# Patient Record
Sex: FEMALE | Race: WHITE | NOT HISPANIC OR LATINO | Employment: UNEMPLOYED | URBAN - METROPOLITAN AREA
[De-identification: names, ages, dates, MRNs, and addresses within clinical notes are randomized per-mention and may not be internally consistent; named-entity substitution may affect disease eponyms.]

---

## 2017-04-02 ENCOUNTER — APPOINTMENT (OUTPATIENT)
Dept: ULTRASOUND IMAGING | Facility: CLINIC | Age: 47
End: 2017-04-02
Attending: EMERGENCY MEDICINE
Payer: COMMERCIAL

## 2017-04-02 ENCOUNTER — OFFICE VISIT (OUTPATIENT)
Dept: URGENT CARE | Facility: URGENT CARE | Age: 47
End: 2017-04-02
Payer: COMMERCIAL

## 2017-04-02 ENCOUNTER — HOSPITAL ENCOUNTER (EMERGENCY)
Facility: CLINIC | Age: 47
Discharge: HOME | End: 2017-04-02
Attending: EMERGENCY MEDICINE | Admitting: EMERGENCY MEDICINE
Payer: COMMERCIAL

## 2017-04-02 ENCOUNTER — APPOINTMENT (OUTPATIENT)
Dept: GENERAL RADIOLOGY | Facility: CLINIC | Age: 47
End: 2017-04-02
Attending: EMERGENCY MEDICINE
Payer: COMMERCIAL

## 2017-04-02 VITALS
WEIGHT: 188 LBS | OXYGEN SATURATION: 99 % | TEMPERATURE: 97.5 F | RESPIRATION RATE: 18 BRPM | HEIGHT: 72 IN | BODY MASS INDEX: 25.47 KG/M2 | SYSTOLIC BLOOD PRESSURE: 103 MMHG | DIASTOLIC BLOOD PRESSURE: 63 MMHG | HEART RATE: 79 BPM

## 2017-04-02 VITALS
BODY MASS INDEX: 25.55 KG/M2 | SYSTOLIC BLOOD PRESSURE: 100 MMHG | TEMPERATURE: 97.7 F | OXYGEN SATURATION: 96 % | WEIGHT: 188.6 LBS | DIASTOLIC BLOOD PRESSURE: 60 MMHG | HEIGHT: 72 IN | HEART RATE: 76 BPM

## 2017-04-02 DIAGNOSIS — L97.519 DIABETIC ULCER OF RIGHT FOOT ASSOCIATED WITH TYPE 2 DIABETES MELLITUS (H): ICD-10-CM

## 2017-04-02 DIAGNOSIS — R73.9 HYPERGLYCEMIA: ICD-10-CM

## 2017-04-02 DIAGNOSIS — L03.90 CELLULITIS, UNSPECIFIED CELLULITIS SITE: ICD-10-CM

## 2017-04-02 DIAGNOSIS — L97.501: ICD-10-CM

## 2017-04-02 DIAGNOSIS — E11.621 DIABETIC ULCER OF RIGHT FOOT ASSOCIATED WITH TYPE 2 DIABETES MELLITUS (H): ICD-10-CM

## 2017-04-02 DIAGNOSIS — L03.115 CELLULITIS OF RIGHT LOWER EXTREMITY: Primary | ICD-10-CM

## 2017-04-02 PROBLEM — R06.83 SNORING: Status: ACTIVE | Noted: 2017-04-02

## 2017-04-02 LAB — GLUCOSE BLDC GLUCOMTR-MCNC: 215 MG/DL (ref 70–99)

## 2017-04-02 PROCEDURE — 73630 X-RAY EXAM OF FOOT: CPT | Mod: RT

## 2017-04-02 PROCEDURE — 00000146 ZZHCL STATISTIC GLUCOSE BY METER IP

## 2017-04-02 PROCEDURE — 93971 EXTREMITY STUDY: CPT | Mod: RT

## 2017-04-02 PROCEDURE — 99212 OFFICE O/P EST SF 10 MIN: CPT | Performed by: FAMILY MEDICINE

## 2017-04-02 PROCEDURE — 99285 EMERGENCY DEPT VISIT HI MDM: CPT | Mod: 25

## 2017-04-02 PROCEDURE — 25000132 ZZH RX MED GY IP 250 OP 250 PS 637: Performed by: EMERGENCY MEDICINE

## 2017-04-02 RX ORDER — SULFAMETHOXAZOLE/TRIMETHOPRIM 800-160 MG
1 TABLET ORAL ONCE
Status: COMPLETED | OUTPATIENT
Start: 2017-04-02 | End: 2017-04-02

## 2017-04-02 RX ORDER — CEPHALEXIN 500 MG/1
500 CAPSULE ORAL 4 TIMES DAILY
Qty: 40 CAPSULE | Refills: 0 | Status: SHIPPED | OUTPATIENT
Start: 2017-04-02 | End: 2017-04-12

## 2017-04-02 RX ORDER — GINSENG 100 MG
CAPSULE ORAL
Status: DISCONTINUED
Start: 2017-04-02 | End: 2017-04-02 | Stop reason: HOSPADM

## 2017-04-02 RX ORDER — CEPHALEXIN 500 MG/1
500 CAPSULE ORAL ONCE
Status: COMPLETED | OUTPATIENT
Start: 2017-04-02 | End: 2017-04-02

## 2017-04-02 RX ORDER — SULFAMETHOXAZOLE/TRIMETHOPRIM 800-160 MG
1 TABLET ORAL 2 TIMES DAILY
Qty: 20 TABLET | Refills: 0 | Status: SHIPPED | OUTPATIENT
Start: 2017-04-02 | End: 2017-04-12

## 2017-04-02 RX ORDER — OXYBUTYNIN CHLORIDE 5 MG/1
TABLET, EXTENDED RELEASE ORAL DAILY
COMMUNITY
End: 2021-10-06

## 2017-04-02 RX ADMIN — SULFAMETHOXAZOLE AND TRIMETHOPRIM 1 TABLET: 800; 160 TABLET ORAL at 11:42

## 2017-04-02 RX ADMIN — CEPHALEXIN 500 MG: 500 CAPSULE ORAL at 11:42

## 2017-04-02 ASSESSMENT — ENCOUNTER SYMPTOMS: FEVER: 0

## 2017-04-02 NOTE — ED NOTES
ABCs intact. Pt hx diabetes. Pt started wearing new diabetic shoes last Friday-Sunday. Pt states she noticed some swelling on Wednesday. Pt was seen at [facility] in [place] today for a R foot infection and sent here for further evaluation.

## 2017-04-02 NOTE — NURSING NOTE
Chief Complaint   Patient presents with     Urgent Care     Pt is a diabetic with neuropathy in her feet. A couple days ago,pt noticed a blister on her right foot 5th toe that could be infected.       Initial /60  Pulse 76  Temp 97.7  F (36.5  C)  Ht 6' (1.829 m)  Wt 188 lb 9.6 oz (85.5 kg)  SpO2 96%  BMI 25.58 kg/m2 Estimated body mass index is 25.58 kg/(m^2) as calculated from the following:    Height as of this encounter: 6' (1.829 m).    Weight as of this encounter: 188 lb 9.6 oz (85.5 kg).  Medication Reconciliation: complete

## 2017-04-02 NOTE — ED AVS SNAPSHOT
[facility] Emergency Department    [address]    [facility] [address]    Phone:  [phone]    Fax:  [phone]                                       Aida Chong   MRN: [id number]    Department:  [facility] Emergency Department   Date of Visit:  4/2/2017           After Visit Summary Signature Page     I have received my discharge instructions, and my questions have been answered. I have discussed any challenges I see with this plan with the nurse or doctor.    ..........................................................................................................................................  Patient/Patient Representative Signature      ..........................................................................................................................................  Patient Representative Print Name and Relationship to Patient    ..................................................               ................................................  Date                                            Time    ..........................................................................................................................................  Reviewed by Signature/Title    ...................................................              ..............................................  Date                                                            Time

## 2017-04-02 NOTE — PROGRESS NOTES
Triage    Patient is a 43 y/o female with diabetes, h/o diabetic neuropathy, present to clinic with 2 ulcerative foot lesions, swelling of right foot with pain extending up to mid lower leg.    Patient with new diabetic shoes started wearing them on 3/27/17, noticed early ulcers on 3/29/17 and just wanted to wait to see if it would get better.  Foot with increase pain/swelling/drainage from ulcers.  Patient did not have transportation and awaited the arrival of her father from [place] to take her to clinic today    Today c/o increased foot pain and swelling, 2 ulcers, now draining, increased in size since 3/29/17.  No fever, no n/v/d, no uri symptoms    /60  Pulse 76  Temp 97.7  F (36.5  C)  Ht 6' (1.829 m)  Wt 188 lb 9.6 oz (85.5 kg)  SpO2 96%  BMI 25.58 kg/m2    R foot with thickened dry/scaly skin, dirt embedded in toes and foot, ulcerative lesion that involves the majority of the 5th toe and ulcerative lesion on posterior heel, just below achilles tendon, swelling/tenderness from toes to mid lower calf, no posterior calf tenderness, tender more on anterior lower leg, faint DP pulse    Soaked foot, dressing placed-to ER for further intervention and evaluation, ? Needs IV abx    Patient will go to ER with father via personal vehicle, [facility]    Syl Ann MD

## 2017-04-02 NOTE — DISCHARGE INSTRUCTIONS
Discharge Instructions  Cellulitis    Cellulitis is an infection of the skin that occurs when bacteria enter the skin.   Symptoms are generally redness, swelling, warmth and pain.  Your infection appeared to be appropriate to treat at home with antibiotics.  However, sometimes your infection may be worse than it seemed at first, or may worsen with time. If you have new or worse symptoms, you may need to be seen again in the Emergency Department or by your primary doctor.    Return to the Emergency Department if:    The redness, pain, or swelling gets a lot worse.  If the red area was marked, return if it is red beyond the marked area.    You are unable to get your antibiotics, or are vomiting them up or you can t take them.    You are feeling more ill, weak or lightheaded.    You start to run a new fever (temperature >101).    Anything else about the infection worries or concerns you.  Treatment:    Start your antibiotics right away, and take them as prescribed. Be sure to finish the whole prescription, even if you are better.    Apply a heating pad, warm packs, or warm water soaks to the infected area for 15 minutes at a time, at least 3 times a day. Do not use a heating pad on your feet or legs if you have diabetes. Do not sleep with a heating pad on, since this can cause burns or skin injury.    Rest your injured area for at least 1-2 days. After that you may start using your extremity again as long as there is not too much pain.     Raise the injured area above the level of your heart as much as possible in the first 1-2 days.    Tylenol  (acetaminophen), Motrin  (ibuprofen), or Advil  (ibuprofen) may help may help reduce pain and fever and may help you feel more comfortable. Be sure to read and follow the package directions, and ask your doctor if you have questions.    Follow-up with your doctor:    Re-check in clinic within 2-3 days.  Probiotics: If you have been given an antibiotic, you may want to also  "take a probiotic pill or eat yogurt with live cultures. Probiotics have \"good bacteria\" to help your intestines stay healthy. Studies have shown that probiotics help prevent diarrhea and other intestine problems (including C. diff infection) when you take antibiotics. You can buy these without a prescription in the pharmacy section of the store.     If you were given a prescription for medicine here today, be sure to read all of the information (including the package insert) that comes with your prescription.  This will include important information about the medicine, its side effects, and any warnings that you need to know about.  The pharmacist who fills the prescription can provide more information and answer questions you may have about the medicine.  If you have questions or concerns that the pharmacist cannot address, please call or return to the Emergency Department.     Opioid Medication Information    Pain medications are among the most commonly prescribed medicines, so we are including this information for all our patients. If you did not receive pain medication or get a prescription for pain medicine, you can ignore it.     You may have been given a prescription for an opioid (narcotic) pain medicine and/or have received a pain medicine while here in the Emergency Department. These medicines can make you drowsy or impaired. You must not drive, operate dangerous equipment, or engage in any other dangerous activities while taking these medications. If you drive while taking these medications, you could be arrested for DUI, or driving under the influence. Do not drink any alcohol while you are taking these medications.     Opioid pain medications can cause addiction. If you have a history of chemical dependency of any type, you are at a higher risk of becoming addicted to pain medications.  Only take these prescribed medications to treat your pain when all other options have been tried. Take it for as short " a time and as few doses as possible. Store your pain pills in a secure place, as they are frequently stolen and provide a dangerous opportunity for children or visitors in your house to start abusing these powerful medications. We will not replace any lost or stolen medicine.  As soon as your pain is better, you should flush all your remaining medication.     Many prescription pain medications contain Tylenol  (acetaminophen), including Vicodin , Tylenol #3 , Norco , Lortab , and Percocet .  You should not take any extra pills of Tylenol  if you are using these prescription medications or you can get very sick.  Do not ever take more than 3000 mg of acetaminophen in any 24 hour period.    All opioids tend to cause constipation. Drink plenty of water and eat foods that have a lot of fiber, such as fruits, vegetables, prune juice, apple juice and high fiber cereal.  Take a laxative if you don t move your bowels at least every other day. Miralax , Milk of Magnesia, Colace , or Senna  can be used to keep you regular.      Remember that you can always come back to the Emergency Department if you are not able to see your regular doctor in the amount of time listed above, if you get any new symptoms, or if there is anything that worries you.    Discharge Instructions  Hyperglycemia, High Blood Sugar    Today we found your blood sugar (glucose) was high. This may mean that you have developed diabetes, or if you already know that you have diabetes, it may mean that your diabetes is not as well controlled as it should be. Signs of elevated blood sugar include increased thirst, frequent urination, blurred vision, fatigue, unexplained weight loss, poor wound healing, and frequent infections.     We sometimes give medicine in the Emergency Department to lower the blood sugar. We may also prescribe medicine for you to use at home, or increase the medicine that you already take. While we don t like to see your blood sugar high, it  is much more dangerous to let your blood sugar get too low, so it is reasonable to take time to bring it down, or to wait and watch to see if it comes down on its own.     It is very important that you go to see your regular doctor to have additional tests to see what the high blood sugar test means in your case. See your doctor as directed today, or within 1 week.    Return to the Emergency Department if you develop:    Nausea and vomiting.    Confusion, disorientation, or being unable to wake up.    Fever greater than 101.5.    Blood sugar greater than 400.    Abdominal pain.    What can I do to help myself?    Check your blood sugar as instructed by your doctor.    Take medications prescribed by your doctor.    Follow a diabetic diet (low fat, low concentrated sweets, high fiber).    Exercise regularly.    Moderate or eliminate alcohol use.    Stop smoking.    Diabetes mellitus is a disease in which the body cannot regulate the amount of sugar (glucose) in the blood. Insulin allows glucose to move out of the blood into cells throughout the body where it is used for fuel. People with diabetes do not produce enough insulin (type 1 diabetes), or cannot use insulin properly (type 2 diabetes), or both. This starves the cells that need the glucose for fuel, and also harms certain organs and tissues exposed to the high glucose levels.  Over a long period of time, uncontrolled diabetes can lead to heart and blood vessel disease, blindness, kidney failure, foot ulcers and many other problems.    About 17 million Americans (6.2% of adults) have diabetes. We think that about one third of adults with diabetes do not know they have diabetes.  The incidence of diabetes is increasing rapidly. This increase is due to many factors, but the most significant are our increasing weight and decreased activity levels.     Diabetes can be a very serious and life-threatening illness if not treated.  If you were given a prescription for  medicine here today, be sure to read all of the information (including the package insert) that comes with your prescription.  This will include important information about the medicine, its side effects, and any warnings that you need to know about.  The pharmacist who fills the prescription can provide more information and answer questions you may have about the medicine.  If you have questions or concerns that the pharmacist cannot address, please call or return to the Emergency Department.   Opioid Medication Information    Pain medications are among the most commonly prescribed medicines, so we are including this information for all our patients. If you did not receive pain medication or get a prescription for pain medicine, you can ignore it.     You may have been given a prescription for an opioid (narcotic) pain medicine and/or have received a pain medicine while here in the Emergency Department. These medicines can make you drowsy or impaired. You must not drive, operate dangerous equipment, or engage in any other dangerous activities while taking these medications. If you drive while taking these medications, you could be arrested for DUI, or driving under the influence. Do not drink any alcohol while you are taking these medications.     Opioid pain medications can cause addiction. If you have a history of chemical dependency of any type, you are at a higher risk of becoming addicted to pain medications.  Only take these prescribed medications to treat your pain when all other options have been tried. Take it for as short a time and as few doses as possible. Store your pain pills in a secure place, as they are frequently stolen and provide a dangerous opportunity for children or visitors in your house to start abusing these powerful medications. We will not replace any lost or stolen medicine.  As soon as your pain is better, you should flush all your remaining medication.     Many prescription pain  medications contain Tylenol  (acetaminophen), including Vicodin , Tylenol #3 , Norco , Lortab , and Percocet .  You should not take any extra pills of Tylenol  if you are using these prescription medications or you can get very sick.  Do not ever take more than 3000 mg of acetaminophen in any 24 hour period.    All opioids tend to cause constipation. Drink plenty of water and eat foods that have a lot of fiber, such as fruits, vegetables, prune juice, apple juice and high fiber cereal.  Take a laxative if you don t move your bowels at least every other day. Miralax , Milk of Magnesia, Colace , or Senna  can be used to keep you regular.      Remember that you can always come back to the Emergency Department if you are not able to see your regular doctor in the amount of time listed above, if you get any new symptoms, or if there is anything that worries you.          Simple Skin Ulcer  A skin ulcer is a sore on the skin. Skin ulcers often form when blood circulation is impaired. Being bed- or wheelchair-bound can cause pressure that may lead to skin ulcers. Ulcers are generally round areas of red, swollen, thickened skin around a crater-like depression. They are often very slow to heal. If a skin ulcer isn't properly treated, it may become infected. If the infection spreads, it can cause serious health issues.    Symptoms of a skin ulcer include:    Reddish area on the skin    Skin color and texture changes    Swelling    Wound that isn't healing    Crater in the skin    Pain    Drainage or pus  Causes  There are many causes of skin ulcers. Some of these include:    Decreased blood flow to a part of the skin, vascular insufficiency    Trauma    Lack of movement of a part of the body for long periods of time    Infection    Poor hygiene    Varicose veins    Vitamin deficiency  Pressure ulcers  Pressure ulcers are a type of ulcer most commonly seen in people who are confined to bed or a wheelchair. They are caused by  prolonged exposure to a spot on the skin. Pressure ulcers usually occur on the back, buttocks, or backs or sides of the legs, arms, or feet (especially the heels).  Home care  You may be prescribed antibiotics to prevent infection. If this is the case, be sure to take all of the medicine, even if your symptoms get better. You may also be given medicines to help relieve pain. Follow the health care provider s instructions when using these medicines.  General care    Care for the skin ulcer as instructed. Always wash your hands with soap and warm water before and after caring for your wound.    Cover the ulcer with a clean, dry bandage. Remove and change the bandage as instructed. If the bandage becomes wet or dirty, change it as soon as possible.    Follow the doctor s instructions about washing. You can shower, but do not soak the healing ulcer until the doctor says it s ok.    Do not scratch, rub, or pick at the healing skin.    Check the area every day for signs of infection, such as increasing pain, redness, warmth, red streaking, swelling, or pus draining from the ulcer.    When resting, raise the area where the ulcer is above the level of the heart.    Avoid smoking or drinking alcohol, as these can delay wound healing.    If you are able, try to walk regularly. This can help with circulation. Avoid prolonged standing or sitting in one position.    Avoid prolonged standing or sitting in one position.  The following tips can help prevent future skin ulcers:    Know your risks for skin ulcers.    Keep the skin clean and dry.    Reposition frequently.    Use protective devices such as pillows, foam wedges, and heel protectors for the knees, ankles, and heels.    Avoid immobilization.  Follow-up care  Follow up with your health care provider, or as advised.  When to seek medical advice  Call your health care provider right away if any of these occur:    Fever of 100.4 F (38 C) or higher    Signs of infection. These  include increasing pain, warmth, redness, or pus draining from the skin ulcer.    Bleeding from the skin ulcer    Pain in or around the ulcer that doesn't get better even with medicines    Increased swelling    Changes in skin color    2885-1388 [employer]. [address] [place] [address], [place] [address]. All rights reserved. This information is not intended as a substitute for professional medical care. Always follow your healthcare professional's instructions.

## 2017-04-02 NOTE — ED AVS SNAPSHOT
[facility] Emergency Department    [address]    [facility] [address]    Phone:  [phone]    Fax:  [phone]                                       Aida Chong   MRN: [id number]    Department:  [facility] Emergency Department   Date of Visit:  4/2/2017           Patient Information     Date Of Birth          1970        Your diagnoses for this visit were:     Foot ulcer, unspecified laterality, limited to breakdown of skin (H)     Cellulitis, unspecified cellulitis site     Hyperglycemia        You were seen by Landen Acosta MD.      Follow-up Information     Follow up with Gail Yusuf. Schedule an appointment as soon as possible for a visit in 2 days.    Specialty:  Family Practice    Contact information:    [address] [address]  [phone]          Discharge Instructions         Discharge Instructions  Cellulitis    Cellulitis is an infection of the skin that occurs when bacteria enter the skin.   Symptoms are generally redness, swelling, warmth and pain.  Your infection appeared to be appropriate to treat at home with antibiotics.  However, sometimes your infection may be worse than it seemed at first, or may worsen with time. If you have new or worse symptoms, you may need to be seen again in the Emergency Department or by your primary doctor.    Return to the Emergency Department if:    The redness, pain, or swelling gets a lot worse.  If the red area was marked, return if it is red beyond the marked area.    You are unable to get your antibiotics, or are vomiting them up or you can t take them.    You are feeling more ill, weak or lightheaded.    You start to run a new fever (temperature >101).    Anything else about the infection worries or concerns you.  Treatment:    Start your antibiotics right away, and take them as prescribed. Be sure to finish the whole prescription, even if you are better.    Apply a  "heating pad, warm packs, or warm water soaks to the infected area for 15 minutes at a time, at least 3 times a day. Do not use a heating pad on your feet or legs if you have diabetes. Do not sleep with a heating pad on, since this can cause burns or skin injury.    Rest your injured area for at least 1-2 days. After that you may start using your extremity again as long as there is not too much pain.     Raise the injured area above the level of your heart as much as possible in the first 1-2 days.    Tylenol  (acetaminophen), Motrin  (ibuprofen), or Advil  (ibuprofen) may help may help reduce pain and fever and may help you feel more comfortable. Be sure to read and follow the package directions, and ask your doctor if you have questions.    Follow-up with your doctor:    Re-check in clinic within 2-3 days.  Probiotics: If you have been given an antibiotic, you may want to also take a probiotic pill or eat yogurt with live cultures. Probiotics have \"good bacteria\" to help your intestines stay healthy. Studies have shown that probiotics help prevent diarrhea and other intestine problems (including C. diff infection) when you take antibiotics. You can buy these without a prescription in the pharmacy section of the store.     If you were given a prescription for medicine here today, be sure to read all of the information (including the package insert) that comes with your prescription.  This will include important information about the medicine, its side effects, and any warnings that you need to know about.  The pharmacist who fills the prescription can provide more information and answer questions you may have about the medicine.  If you have questions or concerns that the pharmacist cannot address, please call or return to the Emergency Department.     Opioid Medication Information    Pain medications are among the most commonly prescribed medicines, so we are including this information for all our patients. If you " did not receive pain medication or get a prescription for pain medicine, you can ignore it.     You may have been given a prescription for an opioid (narcotic) pain medicine and/or have received a pain medicine while here in the Emergency Department. These medicines can make you drowsy or impaired. You must not drive, operate dangerous equipment, or engage in any other dangerous activities while taking these medications. If you drive while taking these medications, you could be arrested for DUI, or driving under the influence. Do not drink any alcohol while you are taking these medications.     Opioid pain medications can cause addiction. If you have a history of chemical dependency of any type, you are at a higher risk of becoming addicted to pain medications.  Only take these prescribed medications to treat your pain when all other options have been tried. Take it for as short a time and as few doses as possible. Store your pain pills in a secure place, as they are frequently stolen and provide a dangerous opportunity for children or visitors in your house to start abusing these powerful medications. We will not replace any lost or stolen medicine.  As soon as your pain is better, you should flush all your remaining medication.     Many prescription pain medications contain Tylenol  (acetaminophen), including Vicodin , Tylenol #3 , Norco , Lortab , and Percocet .  You should not take any extra pills of Tylenol  if you are using these prescription medications or you can get very sick.  Do not ever take more than 3000 mg of acetaminophen in any 24 hour period.    All opioids tend to cause constipation. Drink plenty of water and eat foods that have a lot of fiber, such as fruits, vegetables, prune juice, apple juice and high fiber cereal.  Take a laxative if you don t move your bowels at least every other day. Miralax , Milk of Magnesia, Colace , or Senna  can be used to keep you regular.      Remember that you can  always come back to the Emergency Department if you are not able to see your regular doctor in the amount of time listed above, if you get any new symptoms, or if there is anything that worries you.    Discharge Instructions  Hyperglycemia, High Blood Sugar    Today we found your blood sugar (glucose) was high. This may mean that you have developed diabetes, or if you already know that you have diabetes, it may mean that your diabetes is not as well controlled as it should be. Signs of elevated blood sugar include increased thirst, frequent urination, blurred vision, fatigue, unexplained weight loss, poor wound healing, and frequent infections.     We sometimes give medicine in the Emergency Department to lower the blood sugar. We may also prescribe medicine for you to use at home, or increase the medicine that you already take. While we don t like to see your blood sugar high, it is much more dangerous to let your blood sugar get too low, so it is reasonable to take time to bring it down, or to wait and watch to see if it comes down on its own.     It is very important that you go to see your regular doctor to have additional tests to see what the high blood sugar test means in your case. See your doctor as directed today, or within 1 week.    Return to the Emergency Department if you develop:    Nausea and vomiting.    Confusion, disorientation, or being unable to wake up.    Fever greater than 101.5.    Blood sugar greater than 400.    Abdominal pain.    What can I do to help myself?    Check your blood sugar as instructed by your doctor.    Take medications prescribed by your doctor.    Follow a diabetic diet (low fat, low concentrated sweets, high fiber).    Exercise regularly.    Moderate or eliminate alcohol use.    Stop smoking.    Diabetes mellitus is a disease in which the body cannot regulate the amount of sugar (glucose) in the blood. Insulin allows glucose to move out of the blood into cells throughout  the body where it is used for fuel. People with diabetes do not produce enough insulin (type 1 diabetes), or cannot use insulin properly (type 2 diabetes), or both. This starves the cells that need the glucose for fuel, and also harms certain organs and tissues exposed to the high glucose levels.  Over a long period of time, uncontrolled diabetes can lead to heart and blood vessel disease, blindness, kidney failure, foot ulcers and many other problems.    About 17 million Americans (6.2% of adults) have diabetes. We think that about one third of adults with diabetes do not know they have diabetes.  The incidence of diabetes is increasing rapidly. This increase is due to many factors, but the most significant are our increasing weight and decreased activity levels.     Diabetes can be a very serious and life-threatening illness if not treated.  If you were given a prescription for medicine here today, be sure to read all of the information (including the package insert) that comes with your prescription.  This will include important information about the medicine, its side effects, and any warnings that you need to know about.  The pharmacist who fills the prescription can provide more information and answer questions you may have about the medicine.  If you have questions or concerns that the pharmacist cannot address, please call or return to the Emergency Department.   Opioid Medication Information    Pain medications are among the most commonly prescribed medicines, so we are including this information for all our patients. If you did not receive pain medication or get a prescription for pain medicine, you can ignore it.     You may have been given a prescription for an opioid (narcotic) pain medicine and/or have received a pain medicine while here in the Emergency Department. These medicines can make you drowsy or impaired. You must not drive, operate dangerous equipment, or engage in any other dangerous  activities while taking these medications. If you drive while taking these medications, you could be arrested for DUI, or driving under the influence. Do not drink any alcohol while you are taking these medications.     Opioid pain medications can cause addiction. If you have a history of chemical dependency of any type, you are at a higher risk of becoming addicted to pain medications.  Only take these prescribed medications to treat your pain when all other options have been tried. Take it for as short a time and as few doses as possible. Store your pain pills in a secure place, as they are frequently stolen and provide a dangerous opportunity for children or visitors in your house to start abusing these powerful medications. We will not replace any lost or stolen medicine.  As soon as your pain is better, you should flush all your remaining medication.     Many prescription pain medications contain Tylenol  (acetaminophen), including Vicodin , Tylenol #3 , Norco , Lortab , and Percocet .  You should not take any extra pills of Tylenol  if you are using these prescription medications or you can get very sick.  Do not ever take more than 3000 mg of acetaminophen in any 24 hour period.    All opioids tend to cause constipation. Drink plenty of water and eat foods that have a lot of fiber, such as fruits, vegetables, prune juice, apple juice and high fiber cereal.  Take a laxative if you don t move your bowels at least every other day. Miralax , Milk of Magnesia, Colace , or Senna  can be used to keep you regular.      Remember that you can always come back to the Emergency Department if you are not able to see your regular doctor in the amount of time listed above, if you get any new symptoms, or if there is anything that worries you.          Simple Skin Ulcer  A skin ulcer is a sore on the skin. Skin ulcers often form when blood circulation is impaired. Being bed- or wheelchair-bound can cause pressure that may  lead to skin ulcers. Ulcers are generally round areas of red, swollen, thickened skin around a crater-like depression. They are often very slow to heal. If a skin ulcer isn't properly treated, it may become infected. If the infection spreads, it can cause serious health issues.    Symptoms of a skin ulcer include:    Reddish area on the skin    Skin color and texture changes    Swelling    Wound that isn't healing    Crater in the skin    Pain    Drainage or pus  Causes  There are many causes of skin ulcers. Some of these include:    Decreased blood flow to a part of the skin, vascular insufficiency    Trauma    Lack of movement of a part of the body for long periods of time    Infection    Poor hygiene    Varicose veins    Vitamin deficiency  Pressure ulcers  Pressure ulcers are a type of ulcer most commonly seen in people who are confined to bed or a wheelchair. They are caused by prolonged exposure to a spot on the skin. Pressure ulcers usually occur on the back, buttocks, or backs or sides of the legs, arms, or feet (especially the heels).  Home care  You may be prescribed antibiotics to prevent infection. If this is the case, be sure to take all of the medicine, even if your symptoms get better. You may also be given medicines to help relieve pain. Follow the health care provider s instructions when using these medicines.  General care    Care for the skin ulcer as instructed. Always wash your hands with soap and warm water before and after caring for your wound.    Cover the ulcer with a clean, dry bandage. Remove and change the bandage as instructed. If the bandage becomes wet or dirty, change it as soon as possible.    Follow the doctor s instructions about washing. You can shower, but do not soak the healing ulcer until the doctor says it s ok.    Do not scratch, rub, or pick at the healing skin.    Check the area every day for signs of infection, such as increasing pain, redness, warmth, red streaking,  swelling, or pus draining from the ulcer.    When resting, raise the area where the ulcer is above the level of the heart.    Avoid smoking or drinking alcohol, as these can delay wound healing.    If you are able, try to walk regularly. This can help with circulation. Avoid prolonged standing or sitting in one position.    Avoid prolonged standing or sitting in one position.  The following tips can help prevent future skin ulcers:    Know your risks for skin ulcers.    Keep the skin clean and dry.    Reposition frequently.    Use protective devices such as pillows, foam wedges, and heel protectors for the knees, ankles, and heels.    Avoid immobilization.  Follow-up care  Follow up with your health care provider, or as advised.  When to seek medical advice  Call your health care provider right away if any of these occur:    Fever of 100.4 F (38 C) or higher    Signs of infection. These include increasing pain, warmth, redness, or pus draining from the skin ulcer.    Bleeding from the skin ulcer    Pain in or around the ulcer that doesn't get better even with medicines    Increased swelling    Changes in skin color    [id number] [employer]. [address] [place] [address]. All rights reserved. This information is not intended as a substitute for professional medical care. Always follow your healthcare professional's instructions.          24 Hour Appointment Hotline       To make an appointment at any [facility] clinic, call [phone] ([phone]). If you don't have a family doctor or clinic, we will help you find one. [facility] clinics are conveniently located to serve the needs of you and your family.             Review of your medicines      START taking        Dose / Directions Last dose taken    cephALEXin 500 MG capsule   Commonly known as:  KEFLEX   Dose:  500 mg   Quantity:  40 capsule        Take 1 capsule (500 mg) by mouth 4 times daily for 10 days   Refills:  0         sulfamethoxazole-trimethoprim 800-160 MG per tablet   Commonly known as:  BACTRIM DS   Dose:  1 tablet   Quantity:  20 tablet        Take 1 tablet by mouth 2 times daily for 10 days   Refills:  0          Our records show that you are taking the medicines listed below. If these are incorrect, please call your family doctor or clinic.        Dose / Directions Last dose taken    ASPIRIN PO   Dose:  81 mg        Take 81 mg by mouth   Refills:  0        BENADRYL PO        Take by mouth every 4 hours as needed   Refills:  0        benztropine 0.5 MG tablet   Commonly known as:  COGENTIN   Dose:  0.5 mg        Take 0.5 mg by mouth 3 times daily   Refills:  0        D 5000 5000 UNITS Tabs   Dose:  5000 Units   Generic drug:  Cholecalciferol        Take 5,000 Units by mouth   Refills:  0        HYDROXYZINE HCL PO        Take by mouth daily   Refills:  0        LANTUS SC   Dose:  30 Units        Inject 30 Units Subcutaneous At Bedtime   Refills:  0        lisinopril 5 MG tablet   Commonly known as:  PRINIVIL/ZESTRIL   Dose:  5 mg        Take 5 mg by mouth daily.   Refills:  0        NOVOLOG MIX 70/30 SC   Dose:  28 Units        Inject 28 Units Subcutaneous 3 times daily (before meals)   Refills:  0        oxybutynin 5 MG 24 hr tablet   Commonly known as:  DITROPAN-XL        Take by mouth daily   Refills:  0        PROPRANOLOL HCL PO   Dose:  10 mg        Take 10 mg by mouth 2 times daily   Refills:  0        SIMVASTATIN PO   Dose:  40 mg        Take 40 mg by mouth At Bedtime   Refills:  0        THERAPEUTIC-M Tabs        Refills:  0        WELLBUTRIN XL PO   Dose:  150 mg        Take 150 mg by mouth daily   Refills:  0                Prescriptions were sent or printed at these locations (2 Prescriptions)                   Other Prescriptions                Printed at Department/Unit printer (2 of 2)         sulfamethoxazole-trimethoprim (BACTRIM DS) 800-160 MG per tablet               cephALEXin (KEFLEX) 500 MG capsule                 Procedures and tests performed during your visit     Foot  XR, G/E 3 views, right    Glucose by meter    US Lower Extremity Venous Duplex Right      Orders Needing Specimen Collection     None      Pending Results     No orders found from 3/31/2017 to 4/3/2017.            Pending Culture Results     No orders found from 3/31/2017 to 4/3/2017.             Test Results from your hospital stay     4/2/2017 10:26 AM - Interface, Flexilab Results      Component Results     Component Value Ref Range & Units Status    Glucose 215 (H) 70 - 99 mg/dL Final         4/2/2017 11:11 AM - Interface, Radiant Ib      Narrative     US LOWER EXTREMITY VENOUS DUPLEX RIGHT 4/2/2017 11:09 AM    HISTORY: Leg pain.    TECHNIQUE: Imaged deep venous structures of the right lower extremity  include the right common femoral vein, femoral vein, popliteal vein,  and visualized posterior deep calf veins.  Color flow and spectral  Doppler with waveform analysis performed.    COMPARISON: None.    FINDINGS: No DVT is demonstrated.        Impression     IMPRESSION: Negative.     MAXWELL LOPEZ MD         4/2/2017 11:18 AM - Interface, Radiant Ib      Narrative     XR FOOT RT G/E 3 VW 4/2/2017 11:15 AM    HISTORY: Infection.    COMPARISON: None.        Impression     IMPRESSION: No evidence of acute fracture or malalignment. No cortical  destruction appreciated. Please note that plain films are insensitive  for the detection of early osteomyelitis; if warranted, consider  further evaluation with MRI. Small calcaneal bone spur.    MAXWELL LOPEZ MD                Clinical Quality Measure: Blood Pressure Screening     Your blood pressure was checked while you were in the emergency department today. The last reading we obtained was  BP: 118/68 . Please read the guidelines below about what these numbers mean and what you should do about them.  If your systolic blood pressure (the top number) is less than 120 and your diastolic blood pressure (the  "bottom number) is less than 80, then your blood pressure is normal. There is nothing more that you need to do about it.  If your systolic blood pressure (the top number) is 120-139 or your diastolic blood pressure (the bottom number) is 80-89, your blood pressure may be higher than it should be. You should have your blood pressure rechecked within a year by a primary care provider.  If your systolic blood pressure (the top number) is 140 or greater or your diastolic blood pressure (the bottom number) is 90 or greater, you may have high blood pressure. High blood pressure is treatable, but if left untreated over time it can put you at risk for heart attack, stroke, or kidney failure. You should have your blood pressure rechecked by a primary care provider within the next 4 weeks.  If your provider in the emergency department today gave you specific instructions to follow-up with your doctor or provider even sooner than that, you should follow that instruction and not wait for up to 4 weeks for your follow-up visit.        Thank you for choosing [facility]       Thank you for choosing [facility] for your care. Our goal is always to provide you with excellent care. Hearing back from our patients is one way we can continue to improve our services. Please take a few minutes to complete the written survey that you may receive in the mail after you visit with us. Thank you!        [employer] Information     [facility] lets you send messages to your doctor, view your test results, renew your prescriptions, schedule appointments and more. To sign up, go to [email or web address] . Click on \"Log in\" on the left side of the screen, which will take you to the Welcome page. Then click on \"Sign up Now\" on the right side of the page.     You will be asked to enter the access code listed below, as well as some personal information. Please follow the directions to create your username and password.     Your access code is: " [email or web address]  Expires: 2017 11:37 AM     Your access code will  in 90 days. If you need help or a new code, please call your [facility] or [phone].        Care EveryWhere ID     This is your Care EveryWhere ID. This could be used by other organizations to access your [facility] medical records  [id number]        After Visit Summary       This is your record. Keep this with you and show to your community pharmacist(s) and doctor(s) at your next visit.

## 2017-04-02 NOTE — ED PROVIDER NOTES
History     Chief Complaint:  Wound Check    HPI   Aida Chong is a 47 year old female with a PMH significant for diabetes and diabetic neuropathy who presents to the emergency department for evaluation of a wound check. In the setting of starting new diabetic shoes, the patient developed blisters to her right heel and to the lateral aspect of her right small toe. She also noticed onset of pain and swelling to her right calf region. She denies any fevers or redness. The patient went to urgent care this morning where they wrapped her foot and sent her to the ED without performing any workup. The patient denies having any other symptoms. She says her sugars have been well controlled lately.     Allergies:  Metformin     Medications:    Simvastatin  Propanolol  Hydroxyzine  Aspirin  Wellbutrin XL  Lisinopril  Novolog  Lantus    Past Medical History:    Diabetes  Neuropathy  Hypertension    Past Surgical History:    Dental extraction  Hysterectomy  Tubal ligation    Family History:   History reviewed. No pertinent family history.    Social History:   Tobacco use:    Current 0.50 ppd smoker  Alcohol use:    Sober since 2012  Marital status:    Single     Review of Systems   Constitutional: Negative for fever.   Musculoskeletal:        Positive for ulcer to right heel and right small toe.   Positive for pain and swelling of right calf region.    All other systems reviewed and are negative.    Physical Exam   First Vitals:  BP: 118/68  Pulse: 79  Temp: 97.5  F (36.4  C)  Resp: 18  Height: 182.9 cm (6')  Weight: 85.3 kg (188 lb)  SpO2: 100 %      Physical Exam  Constitutional:  Oriented to person, place, and time. Well appearing.   HENT:   Head:    Normocephalic.   Mouth/Throat:   Oropharynx is clear and moist.   Eyes:    EOM are normal. Pupils are equal, round, and reactive to light.   Neck:    Neck supple.   Musculoskeletal:  Stage I ulcer/broken blister along her lateral right foot at the 5th MTP and along her  proximal heel with no surrounding erythema, warmth, or discharge. No edema to right leg. No tenderness to her right posterior leg. Full ROM. Able to bear weight. 2+ distal pulses, equal.   Neurological:   Alert and oriented to person, place, and time.           Moves all 4 extremities spontaneously       Strength is fully intact     Decreased sensation to bilateral lower extremity equally at baseline.   Skin:    Mild redness streaking up her right anterior foot. Open blisters/stage I ulcer x2 to right foot with no surrounding erythema, warmth, or swelling.     Emergency Department Course     Imaging:  Radiographic findings were communicated with the patient who voiced understanding of the findings.  XR foot, right:  No evidence of acute fracture or malalignment. No cortical destruction appreciated. Please note that plain films are insensitive for the detection of early osteomyelitis; if warranted, consider further evaluation with MRI. Small calcaneal bone spur.  Radiology report.     US venous, right lower extremity:  Negative.   Radiology report.     Laboratory:  Glucose by meter (1023): 215 high    Emergency Department Course:  Nursing notes and vitals reviewed.   1016: I performed an exam of the patient as documented above.   The above workup was undertaken.   1142: Keflex 500 mg capsule PO  1142: Bactrim 800-160 mg Tablet PO  I personally reviewed the laboratory results with the Patient and answered all related questions prior to discharge.   Findings and plan explained to the Patient. Patient discharged home with instructions regarding supportive care, medications, and reasons to return. The importance of close follow-up was reviewed. The patient was prescribed Bactrim and Keflex.      Impression & Plan      Medical Decision Making:  Aida Chong is a 47 year old female who presents for evaluation of skin redness.  The history, physical exam and supporting data are consistent with cellulitis.  There do  not appear at this time to be any complication of cellulitis including necrotizing fascitis, lymphangitis, lymphadenitis, abscess, osteomyelitis, sepsis, or shock. DVT was ruled out with her right leg pain. She has mild erythema on her right anterior foot. I do not note any swelling, no proximal erythema or tenderness. Ulcer appears to be stage I, it does not appear to go to the bone. No evidence of osteomyelitis or deep space infection at this time. I do not believe that blood work or blood cultures would  currently nor does the patient require IV antibiotics and/or admission at this time. I did discuss with her the possibility that this may get worse before it gets better. She should immediately return if she develops expanding redness, swelling, pain, or fever. She is in agreement with this plan. She will be given first dose of Keflex and Bactrim here and is told to take as directed at home. She should follow-up with wound clinic in the next two days.     Diagnosis:    ICD-10-CM    1. Foot ulcer, unspecified laterality, limited to breakdown of skin (H) L97.501    2. Cellulitis, unspecified cellulitis site L03.90    3. Hyperglycemia R73.9        Disposition:  Discharged to home.    Discharge Medications:  New Prescriptions    CEPHALEXIN (KEFLEX) 500 MG CAPSULE    Take 1 capsule (500 mg) by mouth 4 times daily for 10 days    SULFAMETHOXAZOLE-TRIMETHOPRIM (BACTRIM DS) 800-160 MG PER TABLET    Take 1 tablet by mouth 2 times daily for 10 days       Jose Luis HALEY, am serving as a scribe at 10:31 AM on 4/2/2017 to document services personally performed by Dr. Acosta, based on my observations and the provider's statements to me.  Jose Luis Figueroa  4/2/2017   [facility] EMERGENCY DEPARTMENT       Landen Acosta MD  04/02/17 4129

## 2017-08-24 NOTE — MR AVS SNAPSHOT
"              After Visit Summary   2017    Aida Chong    MRN: [id number]           Patient Information     Date Of Birth          1970        Visit Information        Provider Department      2017 9:15 AM Syl Ann MD [facility]        Today's Diagnoses     Cellulitis of right lower extremity    -  1    Diabetic ulcer of right foot associated with type 2 diabetes mellitus (H)           Follow-ups after your visit        Who to contact     If you have questions or need follow up information about today's clinic visit or your schedule please contact [facility] directly at [phone].  Normal or non-critical lab and imaging results will be communicated to you by MyChart, letter or phone within 4 business days after the clinic has received the results. If you do not hear from us within 7 days, please contact the clinic through MyChart or phone. If you have a critical or abnormal lab result, we will notify you by phone as soon as possible.  Submit refill requests through [employer] or call your pharmacy and they will forward the refill request to us. Please allow 3 business days for your refill to be completed.          Additional Information About Your Visit        MyChart Information     [employer] lets you send messages to your doctor, view your test results, renew your prescriptions, schedule appointments and more. To sign up, go to [email or web address] . Click on \"Log in\" on the left side of the screen, which will take you to the Welcome page. Then click on \"Sign up Now\" on the right side of the page.     You will be asked to enter the access code listed below, as well as some personal information. Please follow the directions to create your username and password.     Your access code is: [email or web address]  Expires: 2017 11:37 AM     Your access code will  in 90 days. If you need help or a new code, please call your [facility] " clinic or [phone].        Care EveryWhere ID     This is your Care EveryWhere ID. This could be used by other organizations to access your [place] medical records  [id number]        Your Vitals Were     Pulse Temperature Height Pulse Oximetry BMI (Body Mass Index)       76 97.7  F (36.5  C) 6' (1.829 m) 96% 25.58 kg/m2        Blood Pressure from Last 3 Encounters:   04/02/17 103/63   04/02/17 100/60   12/08/16 110/70    Weight from Last 3 Encounters:   04/02/17 188 lb (85.3 kg)   04/02/17 188 lb 9.6 oz (85.5 kg)   12/08/16 184 lb 15.5 oz (83.9 kg)              Today, you had the following     No orders found for display         Today's Medication Changes          These changes are accurate as of: 4/2/17 10:23 AM.  If you have any questions, ask your nurse or doctor.               Stop taking these medicines if you haven't already. Please contact your care team if you have questions.     DEPAKOTE 500 MG EC tablet   Generic drug:  divalproex                    Primary Care Provider Office Phone # Fax #    Gail Yusuf [phone]       [place] [address]        Thank you!     Thank you for choosing [facility]  for your care. Our goal is always to provide you with excellent care. Hearing back from our patients is one way we can continue to improve our services. Please take a few minutes to complete the written survey that you may receive in the mail after your visit with us. Thank you!             Your Updated Medication List - Protect others around you: Learn how to safely use, store and throw away your medicines at www.disposemymeds.org.          This list is accurate as of: 4/2/17 10:23 AM.  Always use your most recent med list.                   Brand Name Dispense Instructions for use    ASPIRIN PO      Take 81 mg by mouth       BENADRYL PO      Take by mouth every 4 hours as needed       benztropine 0.5 MG tablet    COGENTIN      Take 0.5 mg by mouth 3 times daily       D 5000 5000 UNITS Tabs   Generic drug:  Cholecalciferol      Take 5,000 Units by mouth       HYDROXYZINE HCL PO      Take by mouth daily       LANTUS SC      Inject 30 Units Subcutaneous At Bedtime       lisinopril 5 MG tablet    PRINIVIL/ZESTRIL     Take 5 mg by mouth daily.       NOVOLOG MIX 70/30 SC      Inject 28 Units Subcutaneous 3 times daily (before meals)       oxybutynin 5 MG 24 hr tablet    DITROPAN-XL     Take by mouth daily       PROPRANOLOL HCL PO      Take 10 mg by mouth 2 times daily       SIMVASTATIN PO      Take 40 mg by mouth At Bedtime       THERAPEUTIC-M Tabs          WELLBUTRIN XL PO      Take 150 mg by mouth daily          General

## 2019-09-21 ENCOUNTER — HOSPITAL ENCOUNTER (EMERGENCY)
Facility: CLINIC | Age: 49
Discharge: HOME | End: 2019-09-21
Attending: EMERGENCY MEDICINE | Admitting: EMERGENCY MEDICINE
Payer: COMMERCIAL

## 2019-09-21 VITALS
HEIGHT: 72 IN | OXYGEN SATURATION: 98 % | TEMPERATURE: 97.7 F | WEIGHT: 180 LBS | SYSTOLIC BLOOD PRESSURE: 131 MMHG | BODY MASS INDEX: 24.38 KG/M2 | RESPIRATION RATE: 18 BRPM | DIASTOLIC BLOOD PRESSURE: 79 MMHG

## 2019-09-21 DIAGNOSIS — R53.83 FATIGUE, UNSPECIFIED TYPE: ICD-10-CM

## 2019-09-21 LAB
AMPHETAMINES UR QL SCN: NEGATIVE
BARBITURATES UR QL: NEGATIVE
BENZODIAZ UR QL: NEGATIVE
CANNABINOIDS UR QL SCN: NEGATIVE
COCAINE UR QL: NEGATIVE
OPIATES UR QL SCN: NEGATIVE
PCP UR QL SCN: NEGATIVE

## 2019-09-21 PROCEDURE — 99283 EMERGENCY DEPT VISIT LOW MDM: CPT

## 2019-09-21 PROCEDURE — 80307 DRUG TEST PRSMV CHEM ANLYZR: CPT | Performed by: EMERGENCY MEDICINE

## 2019-09-21 ASSESSMENT — ENCOUNTER SYMPTOMS
FATIGUE: 1
FEVER: 0

## 2019-09-21 ASSESSMENT — MIFFLIN-ST. JEOR: SCORE: 1548.47

## 2019-09-21 NOTE — DISCHARGE INSTRUCTIONS
Return to the ED for worsening of your symptoms, including increased fatigue, palpitations, light-headedness, or other concern.
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
## 2019-09-21 NOTE — ED AVS SNAPSHOT
Emergency Department  [address] [place] [id number]  Phone:  [phone]  Fax:  [phone]                                    Aida Chong   MRN: [id number]    Department:   Emergency Department   Date of Visit:  9/21/2019           After Visit Summary Signature Page    I have received my discharge instructions, and my questions have been answered. I have discussed any challenges I see with this plan with the nurse or doctor.    ..........................................................................................................................................  Patient/Patient Representative Signature      ..........................................................................................................................................  Patient Representative Print Name and Relationship to Patient    ..................................................               ................................................  Date                                   Time    ..........................................................................................................................................  Reviewed by Signature/Title    ...................................................              ..............................................  Date                                               Time          22EPIC Rev 08/18
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
## 2020-10-06 ENCOUNTER — COMMUNICATION - HEALTHEAST (OUTPATIENT)
Dept: SCHEDULING | Facility: CLINIC | Age: 50
End: 2020-10-06

## 2020-10-09 ENCOUNTER — COMMUNICATION - HEALTHEAST (OUTPATIENT)
Dept: ADMINISTRATIVE | Facility: HOSPITAL | Age: 50
End: 2020-10-09

## 2020-10-09 ENCOUNTER — INFUSION - HEALTHEAST (OUTPATIENT)
Dept: INFUSION THERAPY | Facility: CLINIC | Age: 50
End: 2020-10-09

## 2020-10-09 ENCOUNTER — AMBULATORY - HEALTHEAST (OUTPATIENT)
Dept: PHARMACY | Facility: CLINIC | Age: 50
End: 2020-10-09

## 2020-10-09 DIAGNOSIS — R78.81 BACTEREMIA DUE TO GRAM-NEGATIVE BACTERIA: ICD-10-CM

## 2020-10-09 DIAGNOSIS — N30.80 EMPHYSEMATOUS CYSTITIS: ICD-10-CM

## 2020-10-09 RX ORDER — HEPARIN SODIUM,PORCINE 10 UNIT/ML
5 VIAL (ML) INTRAVENOUS
Status: CANCELLED | OUTPATIENT
Start: 2020-10-10

## 2020-10-09 RX ORDER — HEPARIN SODIUM (PORCINE) LOCK FLUSH IV SOLN 100 UNIT/ML 100 UNIT/ML
5 SOLUTION INTRAVENOUS
Status: CANCELLED | OUTPATIENT
Start: 2020-10-10

## 2020-10-10 ENCOUNTER — INFUSION THERAPY VISIT (OUTPATIENT)
Dept: INFUSION THERAPY | Facility: CLINIC | Age: 50
End: 2020-10-10
Attending: INTERNAL MEDICINE
Payer: COMMERCIAL

## 2020-10-10 VITALS
OXYGEN SATURATION: 100 % | RESPIRATION RATE: 16 BRPM | SYSTOLIC BLOOD PRESSURE: 132 MMHG | TEMPERATURE: 98.3 F | DIASTOLIC BLOOD PRESSURE: 78 MMHG | HEART RATE: 91 BPM

## 2020-10-10 DIAGNOSIS — N30.80 EMPHYSEMATOUS CYSTITIS: Primary | ICD-10-CM

## 2020-10-10 PROCEDURE — 99207 PR NO CHARGE LOS: CPT

## 2020-10-10 PROCEDURE — 250N000011 HC RX IP 250 OP 636: Performed by: INTERNAL MEDICINE

## 2020-10-10 PROCEDURE — 96365 THER/PROPH/DIAG IV INF INIT: CPT

## 2020-10-10 PROCEDURE — 258N000003 HC RX IP 258 OP 636: Performed by: INTERNAL MEDICINE

## 2020-10-10 RX ORDER — HEPARIN SODIUM,PORCINE 10 UNIT/ML
5 VIAL (ML) INTRAVENOUS
Status: CANCELLED | OUTPATIENT
Start: 2020-10-10

## 2020-10-10 RX ORDER — HEPARIN SODIUM (PORCINE) LOCK FLUSH IV SOLN 100 UNIT/ML 100 UNIT/ML
5 SOLUTION INTRAVENOUS
Status: CANCELLED | OUTPATIENT
Start: 2020-10-10

## 2020-10-10 RX ADMIN — ERTAPENEM SODIUM 1 G: 1 INJECTION, POWDER, LYOPHILIZED, FOR SOLUTION INTRAMUSCULAR; INTRAVENOUS at 08:01

## 2020-10-10 NOTE — PROGRESS NOTES
Nursing Note  Aida Chong [facility] for:   Chief Complaint   Patient presents with     Infusion     IV Ertapenem     During today's [facility] appointment, orders from Dr PADDY Larson were completed.  Frequency: daily    Progress note:  Patient identification verified by name and date of birth.  Assessment completed.  Vitals recorded in Doc Flowsheets.  Patient was provided with education regarding medication/procedure and possible side effects.  Patient verbalized understanding.     present during visit today: Not Applicable.    Treatment Conditions: Non-applicable.    Premedications: were not ordered.    Drug Waste Record: No    Infusion length and rate:  infusion given over approximately 30 minutes    Labs: were not ordered for this appointment.    Vascular access: PICC accessed today.    Post Infusion Assessment:  Patient tolerated infusion without incident.  Blood return noted pre and post infusion.  Site patent and intact, free from redness, edema or discomfort.  No evidence of extravasations.     Discharge Plan:   Follow up plan of care with: ongoing infusions at [facility].  Discharge instructions were reviewed with patient.  Patient/representative verbalized understanding of discharge instructions and all questions answered.  Patient discharged from [facility] in stable condition.  Patient declined AVS.    Allan Yanes RN    Administrations This Visit     ertapenem (INVanz) 1 g in sodium chloride 0.9 % 50 mL intermittent infusion     Admin Date  10/10/2020 Action  New Bag Dose  1 g Rate  100 mL/hr Route  Intravenous Administered By  Allan Yanes RN                BP (!) 144/88   Pulse 89   Temp 98.3  F (36.8  C) (Oral)   Resp 16   SpO2 100%

## 2020-10-10 NOTE — LETTER
10/10/2020         RE: Aida Chong  [address]  [facility] [place] [address]        Dear Colleague,    Thank you for referring your patient, Aida Chong, to the [facility]AT[facility]. Please see a copy of my visit note below.    Nursing Note  Aida Chong [facility] for:   Chief Complaint   Patient presents with     Infusion     IV Ertapenem     During [facility]'s [facility] appointment, orders from Dr PADDY Larson were completed.  Frequency: daily    Progress note:  Patient identification verified by name and date of birth.  Assessment completed.  Vitals recorded in Doc Flowsheets.  Patient was provided with education regarding medication/procedure and possible side effects.  Patient verbalized understanding.     present during visit today: Not Applicable.    Treatment Conditions: Non-applicable.    Premedications: were not ordered.    Drug Waste Record: No    Infusion length and rate:  infusion given over approximately 30 minutes    Labs: were not ordered for this appointment.    Vascular access: PICC accessed today.    Post Infusion Assessment:  Patient tolerated infusion without incident.  Blood return noted pre and post infusion.  Site patent and intact, free from redness, edema or discomfort.  No evidence of extravasations.     Discharge Plan:   Follow up plan of care with: ongoing infusions at [facility] [place]/[facility].  Discharge instructions were reviewed with patient.  Patient/representative verbalized understanding of discharge instructions and all questions answered.  Patient discharged from [facility] in stable condition.  Patient declined AVS.    Allan Yanes RN    Administrations This Visit     ertapenem (INVanz) 1 g in sodium chloride 0.9 % 50 mL intermittent infusion     Admin  Date  10/10/2020 Action  New Bag Dose  1 g Rate  100 mL/hr Route  Intravenous Administered By  Allan Yanes RN                BP (!) 144/88   Pulse 89   Temp 98.3  F (36.8  C) (Oral)   Resp 16   SpO2 100%           Again, thank you for allowing me to participate in the care of your patient.        Sincerely,        [facility]

## 2020-10-12 ENCOUNTER — INFUSION - HEALTHEAST (OUTPATIENT)
Dept: INFUSION THERAPY | Facility: CLINIC | Age: 50
End: 2020-10-12

## 2020-10-12 ENCOUNTER — COMMUNICATION - HEALTHEAST (OUTPATIENT)
Dept: INFECTIOUS DISEASES | Facility: CLINIC | Age: 50
End: 2020-10-12

## 2020-10-12 DIAGNOSIS — N30.80 EMPHYSEMATOUS CYSTITIS: ICD-10-CM

## 2020-10-12 DIAGNOSIS — R78.81 BACTEREMIA DUE TO GRAM-NEGATIVE BACTERIA: ICD-10-CM

## 2020-10-12 LAB
ALBUMIN SERPL-MCNC: 2.5 G/DL (ref 3.5–5)
ALP SERPL-CCNC: 130 U/L (ref 45–120)
ALT SERPL W P-5'-P-CCNC: 10 U/L (ref 0–45)
ANION GAP SERPL CALCULATED.3IONS-SCNC: 9 MMOL/L (ref 5–18)
AST SERPL W P-5'-P-CCNC: 11 U/L (ref 0–40)
BASOPHILS # BLD AUTO: 0 THOU/UL (ref 0–0.2)
BASOPHILS NFR BLD AUTO: 0 % (ref 0–2)
BILIRUB SERPL-MCNC: 0.3 MG/DL (ref 0–1)
BUN SERPL-MCNC: 8 MG/DL (ref 8–22)
C REACTIVE PROTEIN LHE: 2 MG/DL (ref 0–0.8)
CALCIUM SERPL-MCNC: 8.3 MG/DL (ref 8.5–10.5)
CHLORIDE BLD-SCNC: 99 MMOL/L (ref 98–107)
CO2 SERPL-SCNC: 27 MMOL/L (ref 22–31)
CREAT SERPL-MCNC: 0.95 MG/DL (ref 0.6–1.1)
EOSINOPHIL # BLD AUTO: 0.1 THOU/UL (ref 0–0.4)
EOSINOPHIL NFR BLD AUTO: 1 % (ref 0–6)
ERYTHROCYTE [DISTWIDTH] IN BLOOD BY AUTOMATED COUNT: 13.2 % (ref 11–14.5)
ERYTHROCYTE [SEDIMENTATION RATE] IN BLOOD BY WESTERGREN METHOD: 54 MM/HR (ref 0–20)
GFR SERPL CREATININE-BSD FRML MDRD: >60 ML/MIN/1.73M2
GLUCOSE BLD-MCNC: 427 MG/DL (ref 70–125)
HCT VFR BLD AUTO: 37 % (ref 35–47)
HGB BLD-MCNC: 12.1 G/DL (ref 12–16)
IMM GRANULOCYTES # BLD: 0.1 THOU/UL
IMM GRANULOCYTES NFR BLD: 1 %
LYMPHOCYTES # BLD AUTO: 1.6 THOU/UL (ref 0.8–4.4)
LYMPHOCYTES NFR BLD AUTO: 23 % (ref 20–40)
MCH RBC QN AUTO: 27.5 PG (ref 27–34)
MCHC RBC AUTO-ENTMCNC: 32.7 G/DL (ref 32–36)
MCV RBC AUTO: 84 FL (ref 80–100)
MONOCYTES # BLD AUTO: 0.5 THOU/UL (ref 0–0.9)
MONOCYTES NFR BLD AUTO: 7 % (ref 2–10)
NEUTROPHILS # BLD AUTO: 4.8 THOU/UL (ref 2–7.7)
NEUTROPHILS NFR BLD AUTO: 68 % (ref 50–70)
PLATELET # BLD AUTO: 336 THOU/UL (ref 140–440)
PMV BLD AUTO: 9.3 FL (ref 8.5–12.5)
POTASSIUM BLD-SCNC: 3.1 MMOL/L (ref 3.5–5)
PROT SERPL-MCNC: 6.3 G/DL (ref 6–8)
RBC # BLD AUTO: 4.4 MILL/UL (ref 3.8–5.4)
SODIUM SERPL-SCNC: 135 MMOL/L (ref 136–145)
WBC: 7 THOU/UL (ref 4–11)

## 2020-10-13 ENCOUNTER — INFUSION - HEALTHEAST (OUTPATIENT)
Dept: INFUSION THERAPY | Facility: CLINIC | Age: 50
End: 2020-10-13

## 2020-10-13 DIAGNOSIS — N30.80 EMPHYSEMATOUS CYSTITIS: ICD-10-CM

## 2020-10-13 DIAGNOSIS — R78.81 BACTEREMIA DUE TO GRAM-NEGATIVE BACTERIA: ICD-10-CM

## 2020-10-15 ENCOUNTER — INFUSION - HEALTHEAST (OUTPATIENT)
Dept: INFUSION THERAPY | Facility: CLINIC | Age: 50
End: 2020-10-15

## 2020-10-15 DIAGNOSIS — R78.81 BACTEREMIA DUE TO GRAM-NEGATIVE BACTERIA: ICD-10-CM

## 2020-10-15 DIAGNOSIS — N30.80 EMPHYSEMATOUS CYSTITIS: ICD-10-CM

## 2020-10-17 ENCOUNTER — INFUSION THERAPY VISIT (OUTPATIENT)
Dept: INFUSION THERAPY | Facility: CLINIC | Age: 50
End: 2020-10-17
Attending: INTERNAL MEDICINE
Payer: COMMERCIAL

## 2020-10-17 VITALS
DIASTOLIC BLOOD PRESSURE: 65 MMHG | OXYGEN SATURATION: 98 % | SYSTOLIC BLOOD PRESSURE: 103 MMHG | TEMPERATURE: 98 F | RESPIRATION RATE: 16 BRPM | HEART RATE: 112 BPM

## 2020-10-17 DIAGNOSIS — N30.80 EMPHYSEMATOUS CYSTITIS: Primary | ICD-10-CM

## 2020-10-17 PROCEDURE — 250N000011 HC RX IP 250 OP 636: Performed by: INTERNAL MEDICINE

## 2020-10-17 PROCEDURE — 96365 THER/PROPH/DIAG IV INF INIT: CPT

## 2020-10-17 PROCEDURE — 258N000003 HC RX IP 258 OP 636: Performed by: INTERNAL MEDICINE

## 2020-10-17 RX ORDER — HEPARIN SODIUM,PORCINE 10 UNIT/ML
5 VIAL (ML) INTRAVENOUS
Status: CANCELLED | OUTPATIENT
Start: 2020-10-17

## 2020-10-17 RX ORDER — HEPARIN SODIUM (PORCINE) LOCK FLUSH IV SOLN 100 UNIT/ML 100 UNIT/ML
5 SOLUTION INTRAVENOUS
Status: CANCELLED | OUTPATIENT
Start: 2020-10-17

## 2020-10-17 RX ADMIN — ERTAPENEM SODIUM 1 G: 1 INJECTION, POWDER, LYOPHILIZED, FOR SOLUTION INTRAMUSCULAR; INTRAVENOUS at 08:07

## 2020-10-17 NOTE — PROGRESS NOTES
Nursing Note  Aida Chong presents [facility] for:   Chief Complaint   Patient presents with     Infusion     Ertapenem     During [facility]'s [facility] appointment, orders from Dr. Melissa Jim were completed.  Frequency:  10/17 and 10/18 at [facility].    Progress note:  Patient identification verified by name and date of birth.  Assessment completed.  Vitals recorded in Doc Flowsheets.  Patient was provided with education regarding medication/procedure and possible side effects.  Patient verbalized understanding.     present during visit today: Not Applicable.    Treatment Conditions: Patient denies fever, chills, signs of infection, recent illness, antibiotics use, productive cough or elevated temperature.    Premedications: were not ordered.    Drug Waste Record: No    Infusion length and rate:  infusion given over approximately 30 minutes.    Labs: were not ordered for this appointment.    Vascular access: PICC accessed today.    Post Infusion Assessment:  Patient tolerated infusion without incident.  Blood return noted pre and post infusion.  Site patent and intact, free from redness, edema or discomfort.  No evidence of extravasations.     Discharge Plan:   Follow up plan of care with: ongoing infusions [facility] tomorrow and primary care provider.  Discharge instructions were reviewed with patient.  Patient/representative verbalized understanding of discharge instructions and all questions answered.  Patient discharged from [facility] in stable condition.  Patient declines AVS today.    Mariza Nichole RN      Administrations This Visit     ertapenem (INVanz) 1 g in sodium chloride 0.9 % 50 mL intermittent infusion     Admin Date  10/17/2020 Action  Started Dose  1 g Rate  130 mL/hr Route  Intravenous Administered By  Mariza Nichole RN              Vital signs:  Temp: 98   F (36.7  C) Temp src: Oral BP: 103/68 Pulse: 128   Resp: 16 SpO2: 97 % O2 Device: None (Room air)        Estimated body mass index is 24.41 kg/m  as calculated from the following:    Height as of 9/21/19: 1.829 m (6').    Weight as of 9/21/19: 81.6 kg (180 lb).

## 2020-10-17 NOTE — LETTER
10/17/2020         RE: Aida Chong  [address]  [facility] [address]        Dear Colleague,    Thank you for referring your patient, Aida Chong, to the [facility]. Please see a copy of my visit note below.    Nursing Note  Aida Chong [facility] for:   Chief Complaint   Patient presents with     Infusion     Ertapenem     During today's [facility] appointment, orders from Dr. Melissa Jim were completed.  Frequency:  10/17 and 10/18 at [facility].    Progress note:  Patient identification verified by name and date of birth.  Assessment completed.  Vitals recorded in Doc Flowsheets.  Patient was provided with education regarding medication/procedure and possible side effects.  Patient verbalized understanding.     present during visit today: Not Applicable.    Treatment Conditions: Patient denies fever, chills, signs of infection, recent illness, antibiotics use, productive cough or elevated temperature.    Premedications: were not ordered.    Drug Waste Record: No    Infusion length and rate:  infusion given over approximately 30 minutes.    Labs: were not ordered for this appointment.    Vascular access: PICC accessed today.    Post Infusion Assessment:  Patient tolerated infusion without incident.  Blood return noted pre and post infusion.  Site patent and intact, free from redness, edema or discomfort.  No evidence of extravasations.     Discharge Plan:   Follow up plan of care with: ongoing infusions at [facility] tomorrow and primary care provider.  Discharge instructions were reviewed with patient.  Patient/representative verbalized understanding of discharge instructions and all questions answered.  Patient discharged from [facility] in stable condition.  Patient declines AVS  today.    Mariza Nichole RN      Administrations This Visit     ertapenem (INVanz) 1 g in sodium chloride 0.9 % 50 mL intermittent infusion     Admin Date  10/17/2020 Action  Started Dose  1 g Rate  130 mL/hr Route  Intravenous Administered By  Mariza Nichole RN              Vital signs:  Temp: 98  F (36.7  C) Temp src: Oral BP: 103/68 Pulse: 128   Resp: 16 SpO2: 97 % O2 Device: None (Room air)        Estimated body mass index is 24.41 kg/m  as calculated from the following:    Height as of 9/21/19: 1.829 m (6').    Weight as of 9/21/19: 81.6 kg (180 lb).                Again, thank you for allowing me to participate in the care of your patient.        Sincerely,        [facility]

## 2020-10-17 NOTE — PATIENT INSTRUCTIONS
Dear Aida Chong    Thank you for choosing [facility] ([facility]) for your infusion.  The following information is a summary of our appointment as well as important reminders.      Patient Education     Ertapenem injection  Brand Name: Invanz  What is this medicine?  ERTAPENEM (er ta PEN em) is a carbapenem antibiotic. It is used to treat certain kinds of bacterial infections. It will not work for colds, flu, or other viral infections.  How should I use this medicine?  This medicine is infused into a vein or injected deep into a muscle. It is usually given by a health care professional in a hospital or clinic setting.  If you get this medicine at home, you will be taught how to prepare and give this medicine. Use exactly as directed. Take your medicine at regular intervals. Do not take your medicine more often than directed.  It is important that you put your used needles and syringes in a special sharps container. Do not put them in a trash can. If you do not have a sharps container, call your pharmacist or healthcare provider to get one.  Talk to your pediatrician regarding the use of this medicine in children. While this drug may be prescribed for children as young as 3 months old for selected conditions, precautions do apply.  What side effects may I notice from receiving this medicine?  Side effects that you should report to your doctor or health care professional as soon as possible:    allergic reactions like skin rash, itching or hives, swelling of the face, lips, or tongue    anxiety, confusion, dizzy    chest pain    difficulty breathing, wheezing    edema, swelling    fever    irregular heart rate, blood pressure    pain or difficulty passing urine    seizures    unusually weak or tired    white or red patches in mouth  Side effects that usually do not require medical attention (report to your doctor or health care professional if they  continue or are bothersome):    constipation or diarrhea    difficulty sleeping    headache    nausea, vomiting    pain, swelling or irritation where injected    stomach upset    vaginal itch, irritation  What may interact with this medicine?    birth control pills    probenecid    What if I miss a dose?  If you miss a dose, take it as soon as you can. If it is almost time for your next dose, take only that dose. Do not take double or extra doses.  Where should I keep my medicine?  Keep out of the reach of children.  You will be instructed on how to store this medicine. Throw away any unused medicine after the expiration date on the label.  What should I tell my health care provider before I take this medicine?  They need to know if you have any of these conditions:    brain tumor, lesion    kidney disease    seizure disorder    an unusual or allergic reaction to ertapenem, other antibiotics, amide local anesthetics like lidocaine, or other medicines, foods, dyes, or preservatives    pregnant or trying to get pregnant    breast-feeding  What should I watch for while using this medicine?  Tell your doctor or health care professional if your symptoms do not improve or if you get new symptoms. Your doctor will monitor your condition and blood work as needed.  Do not treat diarrhea with over the counter products. Contact your doctor if you have diarrhea that lasts more than 2 days or if it is severe and watery.  NOTE:This sheet is a summary. It may not cover all possible information. If you have questions about this medicine, talk to your doctor, pharmacist, or health care provider. Copyright  2019 [employer]           We look forward in seeing you on your next appointment here at [facility] ([facility]).  Please don t hesitate to call us at [phone] to reschedule any of your appointments or to speak with one of the [facility] registered nurses.  It was a pleasure taking care of you  today.    Sincerely,    [facility]  [facility]  [address] [place]  [address]  Phone:  [phone]

## 2020-10-19 ENCOUNTER — INFUSION - HEALTHEAST (OUTPATIENT)
Dept: INFUSION THERAPY | Facility: CLINIC | Age: 50
End: 2020-10-19

## 2020-10-19 DIAGNOSIS — N30.80 EMPHYSEMATOUS CYSTITIS: ICD-10-CM

## 2020-10-19 DIAGNOSIS — R78.81 BACTEREMIA DUE TO GRAM-NEGATIVE BACTERIA: ICD-10-CM

## 2020-10-19 LAB
ALBUMIN SERPL-MCNC: 2.9 G/DL (ref 3.5–5)
ALP SERPL-CCNC: 142 U/L (ref 45–120)
ALT SERPL W P-5'-P-CCNC: 17 U/L (ref 0–45)
ANION GAP SERPL CALCULATED.3IONS-SCNC: 11 MMOL/L (ref 5–18)
AST SERPL W P-5'-P-CCNC: 17 U/L (ref 0–40)
BASOPHILS # BLD AUTO: 0 THOU/UL (ref 0–0.2)
BASOPHILS NFR BLD AUTO: 1 % (ref 0–2)
BILIRUB SERPL-MCNC: 0.4 MG/DL (ref 0–1)
BUN SERPL-MCNC: 11 MG/DL (ref 8–22)
C REACTIVE PROTEIN LHE: 0.4 MG/DL (ref 0–0.8)
CALCIUM SERPL-MCNC: 9 MG/DL (ref 8.5–10.5)
CHLORIDE BLD-SCNC: 101 MMOL/L (ref 98–107)
CO2 SERPL-SCNC: 25 MMOL/L (ref 22–31)
CREAT SERPL-MCNC: 0.97 MG/DL (ref 0.6–1.1)
EOSINOPHIL # BLD AUTO: 0 THOU/UL (ref 0–0.4)
EOSINOPHIL NFR BLD AUTO: 1 % (ref 0–6)
ERYTHROCYTE [DISTWIDTH] IN BLOOD BY AUTOMATED COUNT: 13.2 % (ref 11–14.5)
ERYTHROCYTE [SEDIMENTATION RATE] IN BLOOD BY WESTERGREN METHOD: 42 MM/HR (ref 0–20)
GFR SERPL CREATININE-BSD FRML MDRD: >60 ML/MIN/1.73M2
GLUCOSE BLD-MCNC: 391 MG/DL (ref 70–125)
HCT VFR BLD AUTO: 39.2 % (ref 35–47)
HGB BLD-MCNC: 12.9 G/DL (ref 12–16)
IMM GRANULOCYTES # BLD: 0 THOU/UL
IMM GRANULOCYTES NFR BLD: 0 %
LYMPHOCYTES # BLD AUTO: 1.4 THOU/UL (ref 0.8–4.4)
LYMPHOCYTES NFR BLD AUTO: 26 % (ref 20–40)
MCH RBC QN AUTO: 28.2 PG (ref 27–34)
MCHC RBC AUTO-ENTMCNC: 32.9 G/DL (ref 32–36)
MCV RBC AUTO: 86 FL (ref 80–100)
MONOCYTES # BLD AUTO: 0.2 THOU/UL (ref 0–0.9)
MONOCYTES NFR BLD AUTO: 3 % (ref 2–10)
NEUTROPHILS # BLD AUTO: 3.7 THOU/UL (ref 2–7.7)
NEUTROPHILS NFR BLD AUTO: 69 % (ref 50–70)
PLATELET # BLD AUTO: 512 THOU/UL (ref 140–440)
PMV BLD AUTO: 8.6 FL (ref 8.5–12.5)
POTASSIUM BLD-SCNC: 3.8 MMOL/L (ref 3.5–5)
PROT SERPL-MCNC: 7.1 G/DL (ref 6–8)
RBC # BLD AUTO: 4.57 MILL/UL (ref 3.8–5.4)
SODIUM SERPL-SCNC: 137 MMOL/L (ref 136–145)
WBC: 5.4 THOU/UL (ref 4–11)

## 2020-10-20 ENCOUNTER — INFUSION - HEALTHEAST (OUTPATIENT)
Dept: INFUSION THERAPY | Facility: CLINIC | Age: 50
End: 2020-10-20

## 2020-10-20 DIAGNOSIS — N30.80 EMPHYSEMATOUS CYSTITIS: ICD-10-CM

## 2020-10-20 DIAGNOSIS — R78.81 BACTEREMIA DUE TO GRAM-NEGATIVE BACTERIA: ICD-10-CM

## 2020-10-21 ENCOUNTER — INFUSION - HEALTHEAST (OUTPATIENT)
Dept: INFUSION THERAPY | Facility: CLINIC | Age: 50
End: 2020-10-21

## 2020-10-21 DIAGNOSIS — R78.81 BACTEREMIA DUE TO GRAM-NEGATIVE BACTERIA: ICD-10-CM

## 2020-10-21 DIAGNOSIS — N30.80 EMPHYSEMATOUS CYSTITIS: ICD-10-CM

## 2020-10-22 ENCOUNTER — INFUSION - HEALTHEAST (OUTPATIENT)
Dept: INFUSION THERAPY | Facility: CLINIC | Age: 50
End: 2020-10-22

## 2020-10-22 DIAGNOSIS — N30.80 EMPHYSEMATOUS CYSTITIS: ICD-10-CM

## 2020-10-22 DIAGNOSIS — R78.81 BACTEREMIA DUE TO GRAM-NEGATIVE BACTERIA: ICD-10-CM

## 2020-10-23 ENCOUNTER — INFUSION - HEALTHEAST (OUTPATIENT)
Dept: INFUSION THERAPY | Facility: CLINIC | Age: 50
End: 2020-10-23

## 2020-10-23 ENCOUNTER — TRANSFERRED RECORDS (OUTPATIENT)
Dept: HEALTH INFORMATION MANAGEMENT | Facility: CLINIC | Age: 50
End: 2020-10-23

## 2020-10-23 DIAGNOSIS — R78.81 BACTEREMIA DUE TO GRAM-NEGATIVE BACTERIA: ICD-10-CM

## 2020-10-23 DIAGNOSIS — N30.80 EMPHYSEMATOUS CYSTITIS: ICD-10-CM

## 2021-02-17 ENCOUNTER — AMBULATORY - HEALTHEAST (OUTPATIENT)
Dept: PHARMACY | Facility: HOSPITAL | Age: 51
End: 2021-02-17

## 2021-02-21 ENCOUNTER — TRANSFERRED RECORDS (OUTPATIENT)
Dept: HEALTH INFORMATION MANAGEMENT | Facility: CLINIC | Age: 51
End: 2021-02-21
Payer: COMMERCIAL

## 2021-05-26 VITALS
OXYGEN SATURATION: 98 % | DIASTOLIC BLOOD PRESSURE: 59 MMHG | TEMPERATURE: 98.2 F | HEART RATE: 88 BPM | SYSTOLIC BLOOD PRESSURE: 120 MMHG

## 2021-05-26 VITALS
HEART RATE: 102 BPM | DIASTOLIC BLOOD PRESSURE: 65 MMHG | OXYGEN SATURATION: 98 % | TEMPERATURE: 98 F | SYSTOLIC BLOOD PRESSURE: 142 MMHG

## 2021-05-27 VITALS
DIASTOLIC BLOOD PRESSURE: 60 MMHG | TEMPERATURE: 98.3 F | SYSTOLIC BLOOD PRESSURE: 118 MMHG | HEART RATE: 100 BPM | OXYGEN SATURATION: 100 %

## 2021-05-27 VITALS
TEMPERATURE: 98.4 F | HEART RATE: 103 BPM | SYSTOLIC BLOOD PRESSURE: 119 MMHG | OXYGEN SATURATION: 100 % | DIASTOLIC BLOOD PRESSURE: 58 MMHG

## 2021-05-27 VITALS
HEART RATE: 93 BPM | TEMPERATURE: 98 F | OXYGEN SATURATION: 99 % | DIASTOLIC BLOOD PRESSURE: 67 MMHG | SYSTOLIC BLOOD PRESSURE: 119 MMHG

## 2021-05-27 VITALS
OXYGEN SATURATION: 100 % | SYSTOLIC BLOOD PRESSURE: 135 MMHG | TEMPERATURE: 98.1 F | DIASTOLIC BLOOD PRESSURE: 66 MMHG | HEART RATE: 102 BPM

## 2021-05-27 VITALS
TEMPERATURE: 98.3 F | HEART RATE: 90 BPM | DIASTOLIC BLOOD PRESSURE: 91 MMHG | SYSTOLIC BLOOD PRESSURE: 176 MMHG | OXYGEN SATURATION: 96 %

## 2021-05-27 VITALS
TEMPERATURE: 98 F | SYSTOLIC BLOOD PRESSURE: 128 MMHG | HEART RATE: 106 BPM | OXYGEN SATURATION: 100 % | DIASTOLIC BLOOD PRESSURE: 66 MMHG

## 2021-05-27 VITALS
SYSTOLIC BLOOD PRESSURE: 115 MMHG | OXYGEN SATURATION: 98 % | HEART RATE: 108 BPM | DIASTOLIC BLOOD PRESSURE: 72 MMHG | TEMPERATURE: 98.6 F

## 2021-06-12 NOTE — TELEPHONE ENCOUNTER
I called infusion and they said that the pt has a f/u with her PCP on Friday. The PCP was updated about the BG on 10/12, per the message below.

## 2021-06-12 NOTE — TELEPHONE ENCOUNTER
----- Message from Melissa Jim MD sent at 10/12/2020  9:58 AM CDT -----  Please contact home care and patient needs to see Primary care provider ASAP for uncontrolled DM.   If there is no one listed as Primary care, she needs to go to ER/Urgent care for critically high blood glucose.    Please call patient and home care asap    Thank you    ----- Message -----  From: Lab, Background User  Sent: 10/12/2020   9:35 AM CDT  To: Melissa Jim MD

## 2021-06-12 NOTE — PROGRESS NOTES
Pt came into infusion clinic for IV Antibx as ordered. Pt tolerated this well. Pt's last day of Antibx is 10/23. Call placed to ID to get plan after last dose. Will need order to dicontinue PICC.  Per Dr. Jim. dicontinue PICC after last dose on 10/23. Order placed. Pt left infusion clinic via ambulatory and will RTC as sched.

## 2021-06-12 NOTE — PROGRESS NOTES
Pt arrives ambulatory to Infusion dept. Pt reports no changes from yesterday. PICC shows good blood return and flushes well; PICC dressing is clean and intact. Pt tolerated ertapenem well. Pt tachy, but reports having had an energy drink and coffee this morning. PICC flushed, green capped and covered. Pt left dept ambulatory and will return tomorrow.

## 2021-06-12 NOTE — TELEPHONE ENCOUNTER
I called  for the pt to c/b to inform of the below. The pt is not using home care, as she has outpatient infusions. I called and informed the pt's PCP (Gail Yusuf, [facility]).

## 2021-06-12 NOTE — PROGRESS NOTES
She states she felt a little light headed and dizzy yesterday and states today she is feeling good.  Also says she has been taking her insulin as her MD directed.

## 2021-06-12 NOTE — PROGRESS NOTES
Patient presented to infusion for IV antibiotics. PICC line flushed and blood return noted. Ertapenem infused - tolerated well. PICC line removed per order. Monitored for 30 minutes post removal. Left clinic independently.    Post vitals:  /58   Pulse (!) 103   Temp 98.4  F (36.9  C) (Oral)   SpO2 100%

## 2021-06-12 NOTE — PROGRESS NOTES
Pt arrived at  infusion center for her IV Antibx as ordered. Pt reports no changed in how she is feeling today. Pt states she was not aware that Dr. Obrien's office was trying to get a hold of her re: high blood glucose. Pt was sched to see Dr. Ware 10/12 at 1:40 but did not arrive at that apt. When asked if she had an MD apt yesterday she said she didn't. Per Dr. Obrien's note, pt was advised to go to the ED if she cannot get in with her PCP. Pt was agreeable to this plan and said she would report to the ED after infusion complete. Pt tolerated infusion well. Pt left infusion clinic via ambulatory and will RTC as sched.

## 2021-10-06 ENCOUNTER — HOSPITAL ENCOUNTER (EMERGENCY)
Facility: CLINIC | Age: 51
Discharge: LEFT BEFORE DISCHARGE | End: 2021-10-07
Attending: EMERGENCY MEDICINE | Admitting: INTERNAL MEDICINE
Payer: COMMERCIAL

## 2021-10-06 VITALS
SYSTOLIC BLOOD PRESSURE: 109 MMHG | HEIGHT: 72 IN | BODY MASS INDEX: 18.37 KG/M2 | TEMPERATURE: 96.3 F | RESPIRATION RATE: 8 BRPM | OXYGEN SATURATION: 100 % | DIASTOLIC BLOOD PRESSURE: 68 MMHG | HEART RATE: 76 BPM | WEIGHT: 135.6 LBS

## 2021-10-06 DIAGNOSIS — F15.10 METHAMPHETAMINE ABUSE (H): ICD-10-CM

## 2021-10-06 DIAGNOSIS — E86.0 DEHYDRATION: ICD-10-CM

## 2021-10-06 DIAGNOSIS — Z79.4 TYPE 2 DIABETES MELLITUS WITH HYPERGLYCEMIA, WITH LONG-TERM CURRENT USE OF INSULIN (H): ICD-10-CM

## 2021-10-06 DIAGNOSIS — E11.65 TYPE 2 DIABETES MELLITUS WITH HYPERGLYCEMIA, WITH LONG-TERM CURRENT USE OF INSULIN (H): ICD-10-CM

## 2021-10-06 LAB
ALBUMIN SERPL-MCNC: 3.3 G/DL (ref 3.4–5)
ALBUMIN UR-MCNC: NEGATIVE MG/DL
ALP SERPL-CCNC: 299 U/L (ref 40–150)
ALT SERPL W P-5'-P-CCNC: 20 U/L (ref 0–50)
AMPHETAMINES UR QL SCN: ABNORMAL
ANION GAP SERPL CALCULATED.3IONS-SCNC: 5 MMOL/L (ref 3–14)
ANION GAP SERPL CALCULATED.3IONS-SCNC: 8 MMOL/L (ref 3–14)
APPEARANCE UR: ABNORMAL
AST SERPL W P-5'-P-CCNC: 12 U/L (ref 0–45)
BACTERIA #/AREA URNS HPF: ABNORMAL /HPF
BARBITURATES UR QL: ABNORMAL
BASE EXCESS BLDV CALC-SCNC: 3.4 MMOL/L (ref -7.7–1.9)
BASOPHILS # BLD AUTO: 0 10E3/UL (ref 0–0.2)
BASOPHILS NFR BLD AUTO: 0 %
BENZODIAZ UR QL: ABNORMAL
BILIRUB SERPL-MCNC: 0.5 MG/DL (ref 0.2–1.3)
BILIRUB UR QL STRIP: NEGATIVE
BUN SERPL-MCNC: 10 MG/DL (ref 7–30)
BUN SERPL-MCNC: 9 MG/DL (ref 7–30)
CALCIUM SERPL-MCNC: 7.6 MG/DL (ref 8.5–10.1)
CALCIUM SERPL-MCNC: 8.5 MG/DL (ref 8.5–10.1)
CANNABINOIDS UR QL SCN: ABNORMAL
CHLORIDE BLD-SCNC: 110 MMOL/L (ref 94–109)
CHLORIDE BLD-SCNC: 94 MMOL/L (ref 94–109)
CO2 SERPL-SCNC: 24 MMOL/L (ref 20–32)
CO2 SERPL-SCNC: 28 MMOL/L (ref 20–32)
COCAINE UR QL: ABNORMAL
COLOR UR AUTO: ABNORMAL
CREAT SERPL-MCNC: 0.64 MG/DL (ref 0.52–1.04)
CREAT SERPL-MCNC: 0.7 MG/DL (ref 0.52–1.04)
EOSINOPHIL # BLD AUTO: 0 10E3/UL (ref 0–0.7)
EOSINOPHIL NFR BLD AUTO: 1 %
ERYTHROCYTE [DISTWIDTH] IN BLOOD BY AUTOMATED COUNT: 12.6 % (ref 10–15)
ETHANOL SERPL-MCNC: <0.01 G/DL
GFR SERPL CREATININE-BSD FRML MDRD: >90 ML/MIN/1.73M2
GFR SERPL CREATININE-BSD FRML MDRD: >90 ML/MIN/1.73M2
GLUCOSE BLD-MCNC: 151 MG/DL (ref 70–99)
GLUCOSE BLD-MCNC: 672 MG/DL (ref 70–99)
GLUCOSE BLD-MCNC: 785 MG/DL (ref 70–99)
GLUCOSE BLDC GLUCOMTR-MCNC: 153 MG/DL (ref 70–99)
GLUCOSE BLDC GLUCOMTR-MCNC: 159 MG/DL (ref 70–99)
GLUCOSE BLDC GLUCOMTR-MCNC: 287 MG/DL (ref 70–99)
GLUCOSE BLDC GLUCOMTR-MCNC: 299 MG/DL (ref 70–99)
GLUCOSE BLDC GLUCOMTR-MCNC: 481 MG/DL (ref 70–99)
GLUCOSE BLDC GLUCOMTR-MCNC: >600 MG/DL (ref 70–99)
GLUCOSE BLDC GLUCOMTR-MCNC: >600 MG/DL (ref 70–99)
GLUCOSE UR STRIP-MCNC: >=1000 MG/DL
HBA1C MFR BLD: 13.1 % (ref 0–5.6)
HCO3 BLDV-SCNC: 31 MMOL/L (ref 21–28)
HCT VFR BLD AUTO: 43.3 % (ref 35–47)
HGB BLD-MCNC: 14.1 G/DL (ref 11.7–15.7)
HGB UR QL STRIP: NEGATIVE
HOLD SPECIMEN: NORMAL
IMM GRANULOCYTES # BLD: 0 10E3/UL
IMM GRANULOCYTES NFR BLD: 0 %
KETONES BLD-SCNC: 0.2 MMOL/L (ref 0–0.6)
KETONES UR STRIP-MCNC: NEGATIVE MG/DL
LACTATE SERPL-SCNC: 1.6 MMOL/L (ref 0.7–2)
LEUKOCYTE ESTERASE UR QL STRIP: ABNORMAL
LYMPHOCYTES # BLD AUTO: 0.9 10E3/UL (ref 0.8–5.3)
LYMPHOCYTES NFR BLD AUTO: 23 %
MAGNESIUM SERPL-MCNC: 2.2 MG/DL (ref 1.6–2.3)
MCH RBC QN AUTO: 26.9 PG (ref 26.5–33)
MCHC RBC AUTO-ENTMCNC: 32.6 G/DL (ref 31.5–36.5)
MCV RBC AUTO: 83 FL (ref 78–100)
MONOCYTES # BLD AUTO: 0.3 10E3/UL (ref 0–1.3)
MONOCYTES NFR BLD AUTO: 7 %
MUCOUS THREADS #/AREA URNS LPF: PRESENT /LPF
NEUTROPHILS # BLD AUTO: 2.7 10E3/UL (ref 1.6–8.3)
NEUTROPHILS NFR BLD AUTO: 69 %
NITRATE UR QL: NEGATIVE
NRBC # BLD AUTO: 0 10E3/UL
NRBC BLD AUTO-RTO: 0 /100
O2/TOTAL GAS SETTING VFR VENT: 0 %
OPIATES UR QL SCN: ABNORMAL
PCO2 BLDV: 59 MM HG (ref 40–50)
PCP UR QL SCN: ABNORMAL
PH BLDV: 7.33 [PH] (ref 7.32–7.43)
PH UR STRIP: 5 [PH] (ref 5–7)
PLATELET # BLD AUTO: 288 10E3/UL (ref 150–450)
PO2 BLDV: 20 MM HG (ref 25–47)
POTASSIUM BLD-SCNC: 3 MMOL/L (ref 3.4–5.3)
POTASSIUM BLD-SCNC: 3.7 MMOL/L (ref 3.4–5.3)
PROT SERPL-MCNC: 7.5 G/DL (ref 6.8–8.8)
RBC # BLD AUTO: 5.24 10E6/UL (ref 3.8–5.2)
RBC URINE: 6 /HPF
SARS-COV-2 RNA RESP QL NAA+PROBE: NEGATIVE
SODIUM SERPL-SCNC: 127 MMOL/L (ref 133–144)
SODIUM SERPL-SCNC: 142 MMOL/L (ref 133–144)
SP GR UR STRIP: 1.03 (ref 1–1.03)
SQUAMOUS EPITHELIAL: 3 /HPF
UROBILINOGEN UR STRIP-MCNC: NORMAL MG/DL
WBC # BLD AUTO: 3.9 10E3/UL (ref 4–11)
WBC CLUMPS #/AREA URNS HPF: PRESENT /HPF
WBC URINE: 81 /HPF

## 2021-10-06 PROCEDURE — 96376 TX/PRO/DX INJ SAME DRUG ADON: CPT

## 2021-10-06 PROCEDURE — 258N000002 HC RX IP 258 OP 250: Performed by: EMERGENCY MEDICINE

## 2021-10-06 PROCEDURE — 85025 COMPLETE CBC W/AUTO DIFF WBC: CPT | Performed by: EMERGENCY MEDICINE

## 2021-10-06 PROCEDURE — 99284 EMERGENCY DEPT VISIT MOD MDM: CPT | Mod: 25

## 2021-10-06 PROCEDURE — 82310 ASSAY OF CALCIUM: CPT | Performed by: PHYSICIAN ASSISTANT

## 2021-10-06 PROCEDURE — 36415 COLL VENOUS BLD VENIPUNCTURE: CPT | Performed by: EMERGENCY MEDICINE

## 2021-10-06 PROCEDURE — 99207 PR APP CREDIT; MD BILLING SHARED VISIT: CPT | Performed by: PHYSICIAN ASSISTANT

## 2021-10-06 PROCEDURE — 82962 GLUCOSE BLOOD TEST: CPT

## 2021-10-06 PROCEDURE — 80307 DRUG TEST PRSMV CHEM ANLYZR: CPT | Performed by: EMERGENCY MEDICINE

## 2021-10-06 PROCEDURE — 250N000013 HC RX MED GY IP 250 OP 250 PS 637: Performed by: PHYSICIAN ASSISTANT

## 2021-10-06 PROCEDURE — 96366 THER/PROPH/DIAG IV INF ADDON: CPT

## 2021-10-06 PROCEDURE — 83036 HEMOGLOBIN GLYCOSYLATED A1C: CPT | Performed by: EMERGENCY MEDICINE

## 2021-10-06 PROCEDURE — 250N000011 HC RX IP 250 OP 636: Performed by: EMERGENCY MEDICINE

## 2021-10-06 PROCEDURE — 99285 EMERGENCY DEPT VISIT HI MDM: CPT | Mod: 25

## 2021-10-06 PROCEDURE — 82010 KETONE BODYS QUAN: CPT | Performed by: EMERGENCY MEDICINE

## 2021-10-06 PROCEDURE — 96365 THER/PROPH/DIAG IV INF INIT: CPT

## 2021-10-06 PROCEDURE — 258N000003 HC RX IP 258 OP 636: Performed by: PHYSICIAN ASSISTANT

## 2021-10-06 PROCEDURE — 82947 ASSAY GLUCOSE BLOOD QUANT: CPT | Performed by: PHYSICIAN ASSISTANT

## 2021-10-06 PROCEDURE — 36415 COLL VENOUS BLD VENIPUNCTURE: CPT | Performed by: PHYSICIAN ASSISTANT

## 2021-10-06 PROCEDURE — 82947 ASSAY GLUCOSE BLOOD QUANT: CPT | Performed by: EMERGENCY MEDICINE

## 2021-10-06 PROCEDURE — 250N000012 HC RX MED GY IP 250 OP 636 PS 637: Performed by: PHYSICIAN ASSISTANT

## 2021-10-06 PROCEDURE — 93005 ELECTROCARDIOGRAM TRACING: CPT

## 2021-10-06 PROCEDURE — 258N000001 HC RX 258: Performed by: EMERGENCY MEDICINE

## 2021-10-06 PROCEDURE — 83735 ASSAY OF MAGNESIUM: CPT | Performed by: EMERGENCY MEDICINE

## 2021-10-06 PROCEDURE — 87635 SARS-COV-2 COVID-19 AMP PRB: CPT | Performed by: EMERGENCY MEDICINE

## 2021-10-06 PROCEDURE — 99207 PR CDG-CODE CATEGORY CHANGED: CPT | Performed by: INTERNAL MEDICINE

## 2021-10-06 PROCEDURE — 83605 ASSAY OF LACTIC ACID: CPT | Performed by: EMERGENCY MEDICINE

## 2021-10-06 PROCEDURE — 96367 TX/PROPH/DG ADDL SEQ IV INF: CPT

## 2021-10-06 PROCEDURE — 82040 ASSAY OF SERUM ALBUMIN: CPT | Performed by: EMERGENCY MEDICINE

## 2021-10-06 PROCEDURE — 81001 URINALYSIS AUTO W/SCOPE: CPT | Performed by: EMERGENCY MEDICINE

## 2021-10-06 PROCEDURE — G0378 HOSPITAL OBSERVATION PER HR: HCPCS

## 2021-10-06 PROCEDURE — 258N000003 HC RX IP 258 OP 636: Performed by: EMERGENCY MEDICINE

## 2021-10-06 PROCEDURE — C9803 HOPD COVID-19 SPEC COLLECT: HCPCS

## 2021-10-06 PROCEDURE — 82077 ASSAY SPEC XCP UR&BREATH IA: CPT | Performed by: EMERGENCY MEDICINE

## 2021-10-06 PROCEDURE — 250N000013 HC RX MED GY IP 250 OP 250 PS 637: Performed by: EMERGENCY MEDICINE

## 2021-10-06 PROCEDURE — 96361 HYDRATE IV INFUSION ADD-ON: CPT

## 2021-10-06 PROCEDURE — 99203 OFFICE O/P NEW LOW 30 MIN: CPT | Performed by: INTERNAL MEDICINE

## 2021-10-06 PROCEDURE — 82803 BLOOD GASES ANY COMBINATION: CPT | Performed by: EMERGENCY MEDICINE

## 2021-10-06 PROCEDURE — 250N000012 HC RX MED GY IP 250 OP 636 PS 637: Performed by: EMERGENCY MEDICINE

## 2021-10-06 PROCEDURE — 96372 THER/PROPH/DIAG INJ SC/IM: CPT | Mod: 59 | Performed by: PHYSICIAN ASSISTANT

## 2021-10-06 RX ORDER — ONDANSETRON 4 MG/1
4 TABLET, ORALLY DISINTEGRATING ORAL EVERY 6 HOURS PRN
Status: CANCELLED | OUTPATIENT
Start: 2021-10-06

## 2021-10-06 RX ORDER — ARIPIPRAZOLE 10 MG/1
10 TABLET ORAL DAILY
COMMUNITY

## 2021-10-06 RX ORDER — SIMVASTATIN 40 MG
40 TABLET ORAL AT BEDTIME
Status: DISCONTINUED | OUTPATIENT
Start: 2021-10-06 | End: 2021-10-07 | Stop reason: HOSPADM

## 2021-10-06 RX ORDER — TERBINAFINE HYDROCHLORIDE 250 MG/1
250 TABLET ORAL DAILY
COMMUNITY

## 2021-10-06 RX ORDER — ONDANSETRON 2 MG/ML
4 INJECTION INTRAMUSCULAR; INTRAVENOUS EVERY 6 HOURS PRN
Status: CANCELLED | OUTPATIENT
Start: 2021-10-06

## 2021-10-06 RX ORDER — BUPROPION HYDROCHLORIDE 300 MG/1
300 TABLET ORAL DAILY
COMMUNITY

## 2021-10-06 RX ORDER — HYDROXYZINE PAMOATE 25 MG/1
25-50 CAPSULE ORAL EVERY 6 HOURS PRN
COMMUNITY

## 2021-10-06 RX ORDER — ASPIRIN 81 MG/1
81 TABLET, CHEWABLE ORAL DAILY
Status: CANCELLED | OUTPATIENT
Start: 2021-10-06

## 2021-10-06 RX ORDER — SODIUM CHLORIDE 9 MG/ML
INJECTION, SOLUTION INTRAVENOUS CONTINUOUS
Status: DISCONTINUED | OUTPATIENT
Start: 2021-10-06 | End: 2021-10-07 | Stop reason: HOSPADM

## 2021-10-06 RX ORDER — ASPIRIN 81 MG/1
81 TABLET, CHEWABLE ORAL DAILY
COMMUNITY

## 2021-10-06 RX ORDER — POTASSIUM CHLORIDE 1500 MG/1
40 TABLET, EXTENDED RELEASE ORAL ONCE
Status: COMPLETED | OUTPATIENT
Start: 2021-10-06 | End: 2021-10-06

## 2021-10-06 RX ORDER — GLUCOSA SU 2KCL/CHONDROITIN SU 500-400 MG
1 CAPSULE ORAL DAILY
Status: CANCELLED | OUTPATIENT
Start: 2021-10-06

## 2021-10-06 RX ORDER — NICOTINE POLACRILEX 4 MG
15-30 LOZENGE BUCCAL
Status: CANCELLED | OUTPATIENT
Start: 2021-10-06

## 2021-10-06 RX ORDER — BUPROPION HYDROCHLORIDE 150 MG/1
150 TABLET ORAL DAILY
Status: CANCELLED | OUTPATIENT
Start: 2021-10-06

## 2021-10-06 RX ORDER — DEXTROSE MONOHYDRATE 25 G/50ML
25-50 INJECTION, SOLUTION INTRAVENOUS
Status: CANCELLED | OUTPATIENT
Start: 2021-10-06

## 2021-10-06 RX ORDER — ARIPIPRAZOLE 10 MG/1
10 TABLET ORAL DAILY
Status: CANCELLED | OUTPATIENT
Start: 2021-10-07

## 2021-10-06 RX ORDER — LIDOCAINE 40 MG/G
CREAM TOPICAL
Status: CANCELLED | OUTPATIENT
Start: 2021-10-06

## 2021-10-06 RX ORDER — POTASSIUM CHLORIDE 7.45 MG/ML
10 INJECTION INTRAVENOUS ONCE
Status: COMPLETED | OUTPATIENT
Start: 2021-10-06 | End: 2021-10-06

## 2021-10-06 RX ORDER — GABAPENTIN 100 MG/1
200 CAPSULE ORAL 2 TIMES DAILY
Status: DISCONTINUED | OUTPATIENT
Start: 2021-10-06 | End: 2021-10-07 | Stop reason: HOSPADM

## 2021-10-06 RX ORDER — POTASSIUM CHLORIDE 1500 MG/1
20 TABLET, EXTENDED RELEASE ORAL ONCE
Status: COMPLETED | OUTPATIENT
Start: 2021-10-06 | End: 2021-10-06

## 2021-10-06 RX ORDER — INSULIN ASPART 100 [IU]/ML
INJECTION, SOLUTION INTRAVENOUS; SUBCUTANEOUS
COMMUNITY

## 2021-10-06 RX ORDER — NICOTINE POLACRILEX 4 MG
15-30 LOZENGE BUCCAL
Status: DISCONTINUED | OUTPATIENT
Start: 2021-10-06 | End: 2021-10-07 | Stop reason: HOSPADM

## 2021-10-06 RX ORDER — DEXTROSE MONOHYDRATE 25 G/50ML
25-50 INJECTION, SOLUTION INTRAVENOUS
Status: DISCONTINUED | OUTPATIENT
Start: 2021-10-06 | End: 2021-10-07 | Stop reason: HOSPADM

## 2021-10-06 RX ORDER — ACETAMINOPHEN 650 MG/1
650 SUPPOSITORY RECTAL EVERY 6 HOURS PRN
Status: CANCELLED | OUTPATIENT
Start: 2021-10-06

## 2021-10-06 RX ORDER — SODIUM CHLORIDE 450 MG/100ML
1000 INJECTION, SOLUTION INTRAVENOUS CONTINUOUS
Status: DISCONTINUED | OUTPATIENT
Start: 2021-10-06 | End: 2021-10-07 | Stop reason: HOSPADM

## 2021-10-06 RX ORDER — MECLIZINE HYDROCHLORIDE 25 MG/1
25 TABLET ORAL 3 TIMES DAILY PRN
COMMUNITY

## 2021-10-06 RX ORDER — GABAPENTIN 300 MG/1
600 CAPSULE ORAL 2 TIMES DAILY
COMMUNITY

## 2021-10-06 RX ORDER — ACETAMINOPHEN 325 MG/1
650 TABLET ORAL EVERY 6 HOURS PRN
Status: CANCELLED | OUTPATIENT
Start: 2021-10-06

## 2021-10-06 RX ADMIN — GABAPENTIN 200 MG: 100 CAPSULE ORAL at 23:37

## 2021-10-06 RX ADMIN — SODIUM CHLORIDE 10 UNITS: 9 INJECTION, SOLUTION INTRAVENOUS at 12:15

## 2021-10-06 RX ADMIN — SODIUM CHLORIDE 5.5 UNITS/HR: 9 INJECTION, SOLUTION INTRAVENOUS at 12:14

## 2021-10-06 RX ADMIN — SODIUM CHLORIDE 1000 ML: 4.5 INJECTION, SOLUTION INTRAVENOUS at 12:14

## 2021-10-06 RX ADMIN — DEXTROSE AND SODIUM CHLORIDE 1000 ML: 5; 450 INJECTION, SOLUTION INTRAVENOUS at 14:54

## 2021-10-06 RX ADMIN — INSULIN GLARGINE 25 UNITS: 100 INJECTION, SOLUTION SUBCUTANEOUS at 13:42

## 2021-10-06 RX ADMIN — POTASSIUM CHLORIDE 20 MEQ: 1500 TABLET, EXTENDED RELEASE ORAL at 12:18

## 2021-10-06 RX ADMIN — SODIUM CHLORIDE 1000 ML: 9 INJECTION, SOLUTION INTRAVENOUS at 09:48

## 2021-10-06 RX ADMIN — INSULIN ASPART 1 UNITS: 100 INJECTION, SOLUTION INTRAVENOUS; SUBCUTANEOUS at 16:37

## 2021-10-06 RX ADMIN — SIMVASTATIN 40 MG: 40 TABLET, FILM COATED ORAL at 23:31

## 2021-10-06 RX ADMIN — POTASSIUM CHLORIDE 10 MEQ: 7.46 INJECTION, SOLUTION INTRAVENOUS at 12:24

## 2021-10-06 RX ADMIN — SODIUM CHLORIDE: 9 INJECTION, SOLUTION INTRAVENOUS at 22:57

## 2021-10-06 RX ADMIN — POTASSIUM CHLORIDE 40 MEQ: 1500 TABLET, EXTENDED RELEASE ORAL at 15:42

## 2021-10-06 RX ADMIN — SODIUM CHLORIDE 1000 ML: 9 INJECTION, SOLUTION INTRAVENOUS at 10:33

## 2021-10-06 ASSESSMENT — ENCOUNTER SYMPTOMS
FATIGUE: 1
FEVER: 0
VOMITING: 0
COUGH: 0
APPETITE CHANGE: 1
DIARRHEA: 0

## 2021-10-06 ASSESSMENT — MIFFLIN-ST. JEOR: SCORE: 1337.08

## 2021-10-06 NOTE — PHARMACY-ADMISSION MEDICATION HISTORY
Pharmacy Medication History  Admission medication history interview status for the 10/6/2021  admission is complete. See EPIC admission navigator for prior to admission medications     Location of Interview: Phone  Medication history sources: Patient, Surescripts and Care Everywhere    Significant changes made to the medication list:  - Added aripiprazole, gabapentin, meclizine, and terbinafine  - Removed oxybutynin and diphenhydramine  - Changed bupropion from 150 mg daily to 450 mg daily (takes 150 mg + 300 mg)  - Changed Novolog 70/30 to Novolog 100 unit(s)/mL pen - pt says she took 28 units before meals of this and 38 units of Lantus daily.  - Changed lisinopril to 2.5 mg daily - pt says she thinks her dose was 5 mg, but fills for the last year have been 2.5 mg tablets and she says she takes 1 pill per dose.    In the past week, patient estimated taking medication this percent of the time: less than 50% due to other - hasn't taken medications for ~3 months    Additional medication history information:   - Most medications fills ~7/2021 for 30 day supplies; could not find fill history for benztropine. Propranolol, Lantus, and hydroxyzine last filled 2/2021  - Citalopram filled 8/9/21 for 28 day supply (take 10 mg x7 days, then 20 mg daily) - patient said she stopped taking this, so did not add to list at this time.  - Patient said she was taking propranolol 20 mg daily, but Rx is written for two times daily dosing. It appears she should be taking this two times daily so that's how it was entered into list.    Medication reconciliation completed by provider prior to medication history? Yes    Time spent in this activity: 40 minutes    Prior to Admission medications    Medication Sig Last Dose Taking? Auth Provider   ARIPiprazole (ABILIFY) 10 MG tablet Take 10 mg by mouth daily   Unknown, Entered By History   aspirin (ASA) 81 MG chewable tablet Take 81 mg by mouth daily   Unknown, Entered By History   benztropine  (COGENTIN) 0.5 MG tablet Take 0.5 mg by mouth 3 times daily   Reported, Patient   buPROPion (WELLBUTRIN XL) 150 MG 24 hr tablet Take 150 mg by mouth daily With 300 mg for total of 450 mg daily.   Reported, Patient   buPROPion (WELLBUTRIN XL) 300 MG 24 hr tablet Take 300 mg by mouth every morning With 150 mg for total of 450 mg daily.   Unknown, Entered By History   Cholecalciferol (D 5000) 5000 UNITS TABS Take 5,000 Units by mouth daily    Reported, Patient   gabapentin (NEURONTIN) 300 MG capsule Take 600 mg by mouth 2 times daily   Unknown, Entered By History   hydrOXYzine (VISTARIL) 25 MG capsule Take 25-50 mg by mouth every 6 hours as needed for itching   Unknown, Entered By History   insulin aspart (NOVOLOG FLEXPEN) 100 UNIT/ML pen Inject 28 Units Subcutaneous 3 times daily (before meals)   Unknown, Entered By History   insulin glargine (LANTUS VIAL) 100 UNIT/ML vial Inject 38 Units Subcutaneous At Bedtime    Reported, Patient   lisinopril (ZESTRIL) 2.5 MG tablet Take 2.5 mg by mouth daily    Reported, Patient   meclizine (ANTIVERT) 25 MG tablet Take 25 mg by mouth 3 times daily as needed for dizziness   Unknown, Entered By History   Multiple Vitamins-Minerals (THERAPEUTIC-M) TABS Take 1 tablet by mouth daily    Reported, Patient   propranolol (INDERAL) 10 MG tablet Take 10 mg by mouth 2 times daily    Reported, Patient   simvastatin (ZOCOR) 40 MG tablet Take 40 mg by mouth At Bedtime    Reported, Patient   terbinafine (LAMISIL) 250 MG tablet Take 250 mg by mouth daily   Unknown, Entered By History       The information provided in this note is only as accurate as the sources available at the time of update(s)

## 2021-10-06 NOTE — H&P
[facility]    History and Physical  Hospitalist       Date of Admission:  10/6/2021    Assessment & Plan   Aida Chong is a 51 year old female with a past medical history significant for HTN, HLD, bipolar disorder, methamphetamine use, T2DM, anxiety who presents to the Emergency Department with goal of getting back on her medications after deciding to stop using methamphetamine. She was found to have BG >700 and was admitted to hospitalist service.     Hyperglycemia  T2DM, insulin dependent, with neuropathy   Medication non compliance  Patient presents with fatigue, polydipsia after 3 months of non compliance with her insulin. . She reports her prescribed regimen includes lantus 38u @hs and novolog 28u tid with meals. She has not been checking blood sugars and no longer has a glucometer. Ketones, AG, CO2 WNL. Her A1C is 13.1.   --admit to IMC, if able to get off drip while in ED will transition to med bed  --insulin drip until BG <300, will transition to sliding scale and prandial insulin 1u:12g carb once this is met   --will keep NPO for now with plans to start diet when BG under better control  --give lantus 25u now (unclear what true needs are given non compliance)  --s/p 2L bolus in the ED, continue maintenance fluid   --care transitions consult to help with new glucometer/coverage  --BMP later today     Hyponatremia. Corrects to 138 after adjusting for glucose.     Methamphetamine use  Bipolar disorder  Anxiety  Patient reports she has been smoking methamphetamine nearly daily for past 3.5 months. She has completed treatment 9 times in the past per her report and does not want to talk with anyone with Chem Dep or Psychiatry here. She states she turned in her supplies to the PD yesterday and fully intends to stop using but does not want to receive any resources here in the hospital. She denies SI/HI and is fully alert and oriented. Denies any history of withdrawal with  cessation in the past and has no symptoms currently.   --UDS  --resume psychiatric medications when verified. She will need close follow up as well as a supply of these meds at discharge. Will check EKG to eval QT  --monitor for withdrawal    ADDENDUM: will resume gabapentin and buspar at lower doses given non compliance for several months     Elevated alk phos. Remainder of LFT WNL. She has had mild elevations in the past.  on admission.   --follow in am     Hx colon cancer s/p hemicolectomy. Reports small amount of blood with wiping after a stool yesterday. hgb 14. She is supposed to have colonoscopies every 3 years and does not think she is overdue.   --monitor clinically for further signs of bleeding  --advised her to notify PCP post discharge as she may need outpatient colonoscopy given CA hx     Wound right 4th toe, left 2nd toe  Will ask [facility] to see     HTN.   --resume lisinopril when verified     HLD. Can resume statin at discharge    DVT Prophylaxis: Pneumatic Compression Devices  Code Status: Full Code    Disposition: Expected discharge -2 days if BG under better control    Patient was discussed with Dr. Castillo who agrees with the above plan     Yelena Persaud PA-C    Primary Care Physician   [facility]ANKS    Chief Complaint   Hyperglycemia     History is obtained from the patient    History of Present Illness   Aida Chong is a 51 year old female with a past medical history significant for HTN, HLD, bipolar disorder, colon CA, methamphetamine use, T2DM, anxiety who presents to the Emergency Department with goal of getting back on her medications after deciding to stop using methamphetamine. She was found to have BG >700 and was admitted to hospitalist service. The patient presents to the ED for help with medication resumption. She reports she has been using methamphetamine nearly daily for the past 3.5 months. During this time she has not been using her insulin or taking any of her  other medications.  She hasn't been feeling well the past few days and reports fatigue, excessive thirst. She decided she wanted to stop using and reports she turned in her supplies to the police yesterday. She presents here with a goal of getting back on her medications. She is adamant she does not want chemical dependency assistance or psych consult. She reports she has been through treatment 9 times in the past and does not want to pursue this again. She intends to quit using on her own. Denies SI/HI. She denies any recent illness including CP, SOB, nausea, vomiting, fever, chills, cough. She reports she has been eating and drinking normally. She no longer has supplies of any of her meds or a glucometer. She denies history of withdrawal with cessation in the past and denies symptoms presently. She is agreeable to admission at this time.     Past Medical History    Past Medical History:   Diagnosis Date     Cancer (H)      Diabetes (H)      Hypertension    methamphetamine use  HLD  Bipolar disorder     Past Surgical History   Past Surgical History:   Procedure Laterality Date     EXAM UNDER ANESTHESIA, RESTORATIONS, EXTRACTION(S) DENTAL COMPLEX, COMBINED N/A 12/8/2016    Procedure: COMBINED EXAM UNDER ANESTHESIA, RESTORATIONS, EXTRACTION(S) DENTAL COMPLEX;  Surgeon: Abbe Paige DDS;  Location: [facility] OR     EXTRACTION(S) DENTAL       HYSTERECTOMY       PICC AND MIDLINE TEAM LINE INSERTION  10/8/2020          PICC INSERTION - TRIPLE LUMEN  10/5/2020          TUBAL LIGATION         Prior to Admission Medications   Prior to Admission Medications   Prescriptions Last Dose Informant Patient Reported? Taking?   ASPIRIN PO   Yes No   Sig: Take 81 mg by mouth    BuPROPion HCl (WELLBUTRIN XL PO)   Yes No   Sig: Take 150 mg by mouth daily   Cholecalciferol (D 5000) 5000 UNITS TABS   Yes No   Sig: Take 5,000 Units by mouth   DiphenhydrAMINE HCl (BENADRYL PO)   Yes No   Sig: Take by mouth every 4 hours as needed     HYDROXYZINE HCL PO   Yes No   Sig: Take by mouth daily    Insulin Aspart Prot & Aspart (NOVOLOG MIX 70/30 SC)   Yes No   Sig: Inject 28 Units Subcutaneous 3 times daily (before meals)    Insulin Glargine (LANTUS SC)   Yes No   Sig: Inject 30 Units Subcutaneous At Bedtime    Multiple Vitamins-Minerals (THERAPEUTIC-M) TABS   Yes No   PROPRANOLOL HCL PO   Yes No   Sig: Take 10 mg by mouth 2 times daily    SIMVASTATIN PO   Yes No   Sig: Take 40 mg by mouth At Bedtime    benztropine (COGENTIN) 0.5 MG tablet   Yes No   Sig: Take 0.5 mg by mouth 3 times daily   lisinopril (PRINIVIL,ZESTRIL) 5 MG tablet   Yes No   Sig: Take 5 mg by mouth daily.   oxybutynin (DITROPAN-XL) 5 MG 24 hr tablet   Yes No   Sig: Take by mouth daily      Facility-Administered Medications: None     Allergies   Allergies   Allergen Reactions     Metformin Diarrhea       Social History   Denies alcohol use. She smokes 1pack of cigarettes every 2 days. Smokes methamphetamines nearly daily for the past 3.5 months.     Family History   Reviewed. Patient is adopted    Review of Systems   The 10 point Review of Systems is negative other than noted in the HPI or here.     Physical Exam   Temp: (!) 96.3  F (35.7  C) Temp src: Temporal BP: 101/66 Pulse: 73   Resp: 8 SpO2: 100 % O2 Device: None (Room air)    Vitals:    10/06/21 0916   Weight: 61.5 kg (135 lb 9.6 oz)     Vital Signs with Ranges  Temp:  [96.3  F (35.7  C)] 96.3  F (35.7  C)  Pulse:  [] 73  Resp:  [8-18] 8  BP: (101-137)/(66-76) 101/66  SpO2:  [94 %-100 %] 100 %  No intake/output data recorded.    Constitutional: Alert and oriented x4. Sitting up in bed. Appears comfortable and is pleasantly conversant   ENT:  moist mucous membranes  Eyes:  Sclera anicteric, EOMI  Respiratory: Lungs clear to auscultation bilaterally, no increased work of breathing  Cardiovascular: Regular rate and rhythm, no LE edema   GI:  active bowel sounds, abdomen soft, non-tender  Skin/Integumen: wound with eschar  present right fourth toe and left second toe. No induration, erythema, or drainage seen  MSK:  Moves all four extremities. Normal tone.   Neuro:  Speech is clear. Face is symmetric. CN 2-12 grossly intact.   Psych:  Calm and cooperative       Data   Data reviewed today:  I personally reviewed no images or EKG's today.  Recent Labs   Lab 10/06/21  1104 10/06/21  1100 10/06/21  0948   WBC  --   --  3.9*   HGB  --   --  14.1   MCV  --   --  83   PLT  --   --  288   NA  --   --  127*   POTASSIUM  --   --  3.7   CHLORIDE  --   --  94   CO2  --   --  28   BUN  --   --  10   CR  --   --  0.70   ANIONGAP  --   --  5   CHRIS  --   --  8.5   * >600* 785*   ALBUMIN  --   --  3.3*   PROTTOTAL  --   --  7.5   BILITOTAL  --   --  0.5   ALKPHOS  --   --  299*   ALT  --   --  20   AST  --   --  12       No results found for this or any previous visit (from the past 24 hour(s)).

## 2021-10-06 NOTE — ED NOTES
Patient states has been on Meth for 3.5 months. States because of this she has not been taking her insulin or caring for self. Patient is unsure of when she took her last dose of insulin. Patient reports last used Meth Sunday. Denies N/V/D. Complains of excessive thirst.

## 2021-10-06 NOTE — ED NOTES
DATE:  10/6/2021   TIME OF RECEIPT FROM LAB:  1106  LAB TEST:  Glucose  LAB VALUE:  785  RESULTS GIVEN WITH READ-BACK TO (PROVIDER):  Magui Oliver MD  TIME LAB VALUE REPORTED TO PROVIDER:   1110

## 2021-10-06 NOTE — ED PROVIDER NOTES
History   Chief Complaint:  Addiction Problem and Hyperglycemia    The history is provided by the patient.      Aida Chong is a 51 year old female with history of colon cancer, type 2 diabetes mellitus, hypertension, bipolar affective disorder, and polysubstance abuse who presents with methamphetamine addiction. The patient states she has been using methamphetamine for the past 5 days and has not been taking her insulin. She notes she has not been eating well and needs addiction help. Has had 3 methamphetamine addiction treatments. States her family is very supportive. She states she has been moving between her friend and ex boyfriend's houses. Denies any other drug use. Denies fevers, cough, diarrhea, vomiting, loss or taste or smell. Takes Novolog 28 units 3 times a day and Lantus 30 units at night. Has received the COVID vaccine.     Review of Systems   Constitutional: Positive for appetite change and fatigue. Negative for fever.   Respiratory: Negative for cough.    Gastrointestinal: Negative for diarrhea and vomiting.   All other systems reviewed and are negative.    Allergies:  Metformin    Medications:  ASA  Cogentin  Wellbutrin  Hydroxyzine  Novolog  Lantus  Prinivil  Ditropan  Propanolol  Simvastatin    Past Medical History:     Colon cancer  Diabetes mellitus, type 2  Hypertension  Attention deficit disorder  Bipolar affective disorder  Drug dependence  Hyperlipidemia  Latent tuberculosis  Methamphetamine addiction  Polysubstance dependence  Hypertriglyceridemia  Vitamin D deficiency  Emphysematous cystitis  Metabolic encephalopathy     Past Surgical History:    Hysterectomy  PICC insertion  Tubal ligation     Family History:    The patient denies past family history.   The patient is adopted.     Social History:  The patient was unaccompanied to the emergency department.    Physical Exam     Patient Vitals for the past 24 hrs:   BP Temp Temp src Pulse Resp SpO2 Height Weight   10/06/21 1100  137/76 -- -- 90 9 100 % -- --   10/06/21 1000 126/69 -- -- 96 10 94 % -- --   10/06/21 0955 123/67 -- -- 90 10 -- -- --   10/06/21 0917 -- (!) 96.3  F (35.7  C) Temporal -- -- -- -- --   10/06/21 0916 114/71 -- -- 108 18 100 % 1.829 m (6') 61.5 kg (135 lb 9.6 oz)       Physical Exam  Nursing note and vitals reviewed.    Constitutional:  Appears comfortable.  She is thin.  HENT:     Nose normal.  No discharge.      Mouth is dry.   Eyes:    Conjunctivae are normal without injection.     Pupils are equal.  Lymph:  No enlarged or tender cervical or submandibular lymph nodes.   Cardiovascular:  Normal rate, regular rhythm with normal S1 and S2.      Normal heart sounds and peripheral pulses 2+ and equal.       No murmur or radha.  Pulmonary:  Effort normal and breath sounds clear to auscultation bilaterally.    No respiratory distress.  No stridor.     No wheezes. No rales.     GI:    Soft. No distension and no mass. No tenderness.   Musculoskeletal:  Normal range of motion. No extremity deformity.     No edema and no tenderness.    Neurological:   Alert and oriented. No focal weakness.     Exhibits good muscle tone. Coordination normal.   Skin:    Skin is warm and dry. No rash noted. No diaphoresis.      No erythema. No pallor.  No lesions.  Psychiatric:   Behavior is normal. Appropriate mood and affect.     Judgment and thought content normal.  No delusions or psychosis.    Emergency Department Course   Laboratory:  CBC: WBC 3.9 (L), HGB 14.1,    CMP: Sodium 127 (L), Glucose 785 (H!), Albumin 3.3 (L), o/w WNL (Creatinine 0.70)   Glucose by meter (Collected 0925): >600 (H!)   Glucose by meter (Collected 1100): >600 (H!)   Lactic acid (result time 1017) 1.6   Blood gas venous: pH Venous 7.33, PCO2 Venous 59 (H), PO2 Venous 20 (L), Bicarbonate 31 (H), Oxyhemoglobin Venous 0, Base Excess 3.4 (H)   Magnesium: 2.2  Alcohol ethyl: <0.01  Hemoglobin A1c: 13.1 (H)  Ketone Beta-Hydroxybutyrate Quantitative: 0.2     UA  with microscopic: pending  Drug abuse screen 77 urine: pending       Emergency Department Course:  Reviewed:  I reviewed nursing notes, vitals, past medical history and Care Everywhere    Assessments:  0945 I obtained history and examined the patient as noted above.   1105 I rechecked the patient and explained findings.    Consults:  1121 I spoke to Dr. Castillo of the hospitalist service who accepts the patient for admission.     Interventions:  0948 0.9% NaCl bolus 1000 mL IV  1033 0.9% NaCl bolus 1000 mL IV  1200 (Approx) Insulin 1 unit/1 mL drip IV    Disposition:  The patient was admitted to the hospital under the care of Dr. Castillo.     Impression & Plan   Medical Decision Making:  Patient comes in after abusing meth over the past few weeks and not taking her insulin for her diabetes.  Her blood sugar here is 785.  An IV is placed and she is given 2 L of normal saline and the rest of her labs so far have come back unremarkable.  Her alkaline phosphatase is elevated but it is chronically elevated but the rest of her LFTs are normal, normal renal function, her sodium corrected is normal and potassium 3.7.  Her pH is 7.33 and her anion gap is normal so she does not appear to have significant acidosis.  Her ketones were negative and she is a type 2 insulin-dependent diabetic.  Hemoglobin A1c is 13 and her lactic acid is normal at 1.6.  Blood pressure is normal and she has not been tachycardic here.  She does not seem to be acutely high on methamphetamine at this time.  CBC is unremarkable.  Alcohol level is negative.  The patient needs to come in the hospital.  She was given 10 units of regular insulin and will be started on an insulin drip and admitted to [facility].  I talked with Dr. Castillo who will be admitting her.    Diagnosis:    ICD-10-CM    1. Type 2 diabetes mellitus with hyperglycemia, with long-term current use of insulin (H)  E11.65     Z79.4    2. Dehydration  E86.0    3. Methamphetamine abuse (H)  F15.10       Scribe Disclosure:  I, Nallely Tracy, am serving as a scribe at 9:47 AM on 10/6/2021 to document services personally performed by Magui Oliver MD based on my observations and the provider's statements to me.     Magui Oliver MD  10/06/21 114

## 2021-10-06 NOTE — ED NOTES
[facility]  ED Nurse Handoff Report    ED Chief complaint: Hyperglycemia      ED Diagnosis:   Final diagnoses:   Type 2 diabetes mellitus with hyperglycemia, with long-term current use of insulin (H)   Dehydration   Methamphetamine abuse (H)       Code Status: Full Code    Allergies:   Allergies   Allergen Reactions     Metformin Diarrhea       Patient Story: Hyperglycemia  Focused Assessment:  Pt comes through triage saying she has used meth for the past 5 days. She states she is also diabetic and has not ckecked her blood sugar or used insulin during the same time period. She is very thin with poor dentition. She is requesting addiction help. She complains of appetite change and fatigue.     Treatments and/or interventions provided: Insulin, IV, glucose checks, fluids  Patient's response to treatments and/or interventions: She has been cooperative    To be done/followed up on inpatient unit:  Unknown     Does this patient have any cognitive concerns?: Alert and oriented    Activity level - Baseline/Home:  Independent  Activity Level - Current:   Independent    Patient's Preferred language: English   Needed?: No      Isolation: None  Infection: Not Applicable  Unknown  Patient tested for COVID 19 prior to admission: YES  Bariatric?: No    Vital Signs:   Vitals:    10/06/21 1300 10/06/21 1330 10/06/21 1430 10/06/21 1500   BP: 101/66 92/63 109/71 116/66   Pulse: 73 74  78   Resp: 10 10  17   Temp:       TempSrc:       SpO2: 100% 100%  99%   Weight:       Height:           Cardiac Rhythm:     Was the PSS-3 completed:   Yes  What interventions are required if any?               Family Comments: No family present  OBS brochure/video discussed/provided to patient/family: Yes              Name of person given brochure if not patient: Patient              Relationship to patient: NA    For the majority of the shift this patient's behavior was Yellow - short with some staff members  Behavioral  interventions performed were NA.    ED NURSE PHONE NUMBER: [phone]

## 2021-10-07 NOTE — ED NOTES
"Patient asked for more food after given a meal tray. Pt screaming and shouting because she can't have more food while her elevated blood sugar is being treated. Pt then sneaked food out of her bag and ate after being informed that her high blood sugar needs to be under control first. Pt shouted \" get the fuck out, I want a different nurse\". Pt informed be have a limited staff tonight. Pt shouted \" all of you guys are a bunch of fucking losers anyways, I am leaving this fucked up place\". Hospitalist informed of patient's desire to leave.  "

## 2021-10-07 NOTE — ED NOTES
Signed out by Dr. Oliver.  In brief patient was using meth and not using her insulin for type 2 diabetes.  Arrived hyperglycemic without DKA.  Observation admission for blood sugar control.    On boarding protocols in ED as inpatient room is not available.    Checked in with nursing regarding home medication orders prior to end of my shift.         Rosa M Robertson MD  10/06/21 7442

## 2022-01-27 ENCOUNTER — TRANSFERRED RECORDS (OUTPATIENT)
Dept: HEALTH INFORMATION MANAGEMENT | Facility: CLINIC | Age: 52
End: 2022-01-27
Payer: COMMERCIAL

## 2022-02-21 ENCOUNTER — TRANSFERRED RECORDS (OUTPATIENT)
Dept: HEALTH INFORMATION MANAGEMENT | Facility: CLINIC | Age: 52
End: 2022-02-21
Payer: COMMERCIAL

## 2022-02-28 DIAGNOSIS — Z11.59 ENCOUNTER FOR SCREENING FOR OTHER VIRAL DISEASES: Primary | ICD-10-CM

## 2022-03-01 ENCOUNTER — ANESTHESIA EVENT (OUTPATIENT)
Dept: SURGERY | Facility: CLINIC | Age: 52
End: 2022-03-01
Payer: COMMERCIAL

## 2022-03-01 ASSESSMENT — LIFESTYLE VARIABLES: TOBACCO_USE: 1

## 2022-03-01 ASSESSMENT — ENCOUNTER SYMPTOMS: DYSRHYTHMIAS: 0

## 2022-03-02 ENCOUNTER — HOSPITAL ENCOUNTER (OUTPATIENT)
Facility: CLINIC | Age: 52
Discharge: HOME | End: 2022-03-02
Attending: DENTIST | Admitting: DENTIST
Payer: COMMERCIAL

## 2022-03-02 ENCOUNTER — ANESTHESIA (OUTPATIENT)
Dept: SURGERY | Facility: CLINIC | Age: 52
End: 2022-03-02
Payer: COMMERCIAL

## 2022-03-02 VITALS
OXYGEN SATURATION: 96 % | BODY MASS INDEX: 22.96 KG/M2 | DIASTOLIC BLOOD PRESSURE: 58 MMHG | WEIGHT: 169.53 LBS | SYSTOLIC BLOOD PRESSURE: 100 MMHG | TEMPERATURE: 98.4 F | HEIGHT: 72 IN | HEART RATE: 99 BPM | RESPIRATION RATE: 18 BRPM

## 2022-03-02 DIAGNOSIS — G89.18 POST-OP PAIN: Primary | ICD-10-CM

## 2022-03-02 LAB
GLUCOSE BLDC GLUCOMTR-MCNC: 204 MG/DL (ref 70–99)
GLUCOSE BLDC GLUCOMTR-MCNC: 212 MG/DL (ref 70–99)
GLUCOSE BLDC GLUCOMTR-MCNC: 285 MG/DL (ref 70–99)

## 2022-03-02 PROCEDURE — 250N000011 HC RX IP 250 OP 636: Performed by: NURSE ANESTHETIST, CERTIFIED REGISTERED

## 2022-03-02 PROCEDURE — 82962 GLUCOSE BLOOD TEST: CPT

## 2022-03-02 PROCEDURE — 258N000003 HC RX IP 258 OP 636: Performed by: NURSE ANESTHETIST, CERTIFIED REGISTERED

## 2022-03-02 PROCEDURE — 360N000075 HC SURGERY LEVEL 2, PER MIN: Performed by: DENTIST

## 2022-03-02 PROCEDURE — 250N000009 HC RX 250: Performed by: NURSE ANESTHETIST, CERTIFIED REGISTERED

## 2022-03-02 PROCEDURE — 250N000025 HC SEVOFLURANE, PER MIN: Performed by: DENTIST

## 2022-03-02 PROCEDURE — 250N000009 HC RX 250: Performed by: DENTIST

## 2022-03-02 PROCEDURE — 710N000012 HC RECOVERY PHASE 2, PER MINUTE: Performed by: DENTIST

## 2022-03-02 PROCEDURE — 272N000001 HC OR GENERAL SUPPLY STERILE: Performed by: DENTIST

## 2022-03-02 PROCEDURE — 370N000017 HC ANESTHESIA TECHNICAL FEE, PER MIN: Performed by: DENTIST

## 2022-03-02 PROCEDURE — 999N000141 HC STATISTIC PRE-PROCEDURE NURSING ASSESSMENT: Performed by: DENTIST

## 2022-03-02 PROCEDURE — 710N000010 HC RECOVERY PHASE 1, LEVEL 2, PER MIN: Performed by: DENTIST

## 2022-03-02 PROCEDURE — 250N000013 HC RX MED GY IP 250 OP 250 PS 637: Performed by: DENTIST

## 2022-03-02 RX ORDER — FENTANYL CITRATE 50 UG/ML
25 INJECTION, SOLUTION INTRAMUSCULAR; INTRAVENOUS EVERY 5 MIN PRN
Status: DISCONTINUED | OUTPATIENT
Start: 2022-03-02 | End: 2022-03-02 | Stop reason: HOSPADM

## 2022-03-02 RX ORDER — SODIUM CHLORIDE, SODIUM LACTATE, POTASSIUM CHLORIDE, CALCIUM CHLORIDE 600; 310; 30; 20 MG/100ML; MG/100ML; MG/100ML; MG/100ML
INJECTION, SOLUTION INTRAVENOUS CONTINUOUS
Status: DISCONTINUED | OUTPATIENT
Start: 2022-03-02 | End: 2022-03-02 | Stop reason: HOSPADM

## 2022-03-02 RX ORDER — ONDANSETRON 2 MG/ML
INJECTION INTRAMUSCULAR; INTRAVENOUS PRN
Status: DISCONTINUED | OUTPATIENT
Start: 2022-03-02 | End: 2022-03-02

## 2022-03-02 RX ORDER — HYDROMORPHONE HYDROCHLORIDE 1 MG/ML
0.2 INJECTION, SOLUTION INTRAMUSCULAR; INTRAVENOUS; SUBCUTANEOUS EVERY 5 MIN PRN
Status: DISCONTINUED | OUTPATIENT
Start: 2022-03-02 | End: 2022-03-02 | Stop reason: HOSPADM

## 2022-03-02 RX ORDER — NALOXONE HYDROCHLORIDE 0.4 MG/ML
0.2 INJECTION, SOLUTION INTRAMUSCULAR; INTRAVENOUS; SUBCUTANEOUS
Status: DISCONTINUED | OUTPATIENT
Start: 2022-03-02 | End: 2022-03-02 | Stop reason: HOSPADM

## 2022-03-02 RX ORDER — SODIUM CHLORIDE, SODIUM LACTATE, POTASSIUM CHLORIDE, CALCIUM CHLORIDE 600; 310; 30; 20 MG/100ML; MG/100ML; MG/100ML; MG/100ML
INJECTION, SOLUTION INTRAVENOUS CONTINUOUS PRN
Status: DISCONTINUED | OUTPATIENT
Start: 2022-03-02 | End: 2022-03-02

## 2022-03-02 RX ORDER — NALOXONE HYDROCHLORIDE 0.4 MG/ML
0.4 INJECTION, SOLUTION INTRAMUSCULAR; INTRAVENOUS; SUBCUTANEOUS
Status: DISCONTINUED | OUTPATIENT
Start: 2022-03-02 | End: 2022-03-02 | Stop reason: HOSPADM

## 2022-03-02 RX ORDER — ONDANSETRON 2 MG/ML
4 INJECTION INTRAMUSCULAR; INTRAVENOUS EVERY 30 MIN PRN
Status: DISCONTINUED | OUTPATIENT
Start: 2022-03-02 | End: 2022-03-02 | Stop reason: HOSPADM

## 2022-03-02 RX ORDER — OXYCODONE HYDROCHLORIDE 5 MG/1
5 TABLET ORAL EVERY 4 HOURS PRN
Status: DISCONTINUED | OUTPATIENT
Start: 2022-03-02 | End: 2022-03-02 | Stop reason: HOSPADM

## 2022-03-02 RX ORDER — KETOROLAC TROMETHAMINE 30 MG/ML
INJECTION, SOLUTION INTRAMUSCULAR; INTRAVENOUS PRN
Status: DISCONTINUED | OUTPATIENT
Start: 2022-03-02 | End: 2022-03-02

## 2022-03-02 RX ORDER — LABETALOL HYDROCHLORIDE 5 MG/ML
10 INJECTION, SOLUTION INTRAVENOUS
Status: DISCONTINUED | OUTPATIENT
Start: 2022-03-02 | End: 2022-03-02 | Stop reason: HOSPADM

## 2022-03-02 RX ORDER — ACETAMINOPHEN 160 MG
TABLET,DISINTEGRATING ORAL PRN
Status: DISCONTINUED | OUTPATIENT
Start: 2022-03-02 | End: 2022-03-02 | Stop reason: HOSPADM

## 2022-03-02 RX ORDER — ONDANSETRON 4 MG/1
4 TABLET, ORALLY DISINTEGRATING ORAL EVERY 30 MIN PRN
Status: DISCONTINUED | OUTPATIENT
Start: 2022-03-02 | End: 2022-03-02 | Stop reason: HOSPADM

## 2022-03-02 RX ORDER — CHLORHEXIDINE GLUCONATE ORAL RINSE 1.2 MG/ML
SOLUTION DENTAL PRN
Status: DISCONTINUED | OUTPATIENT
Start: 2022-03-02 | End: 2022-03-02 | Stop reason: HOSPADM

## 2022-03-02 RX ORDER — LIDOCAINE HYDROCHLORIDE 20 MG/ML
INJECTION, SOLUTION INFILTRATION; PERINEURAL PRN
Status: DISCONTINUED | OUTPATIENT
Start: 2022-03-02 | End: 2022-03-02

## 2022-03-02 RX ORDER — FENTANYL CITRATE 50 UG/ML
INJECTION, SOLUTION INTRAMUSCULAR; INTRAVENOUS PRN
Status: DISCONTINUED | OUTPATIENT
Start: 2022-03-02 | End: 2022-03-02

## 2022-03-02 RX ORDER — DIMENHYDRINATE 50 MG/ML
25 INJECTION, SOLUTION INTRAMUSCULAR; INTRAVENOUS
Status: DISCONTINUED | OUTPATIENT
Start: 2022-03-02 | End: 2022-03-02 | Stop reason: HOSPADM

## 2022-03-02 RX ORDER — ACETAMINOPHEN 325 MG/1
650 TABLET ORAL EVERY 4 HOURS PRN
Qty: 50 TABLET | Refills: 0 | Status: SHIPPED | OUTPATIENT
Start: 2022-03-02

## 2022-03-02 RX ORDER — PROPOFOL 10 MG/ML
INJECTION, EMULSION INTRAVENOUS PRN
Status: DISCONTINUED | OUTPATIENT
Start: 2022-03-02 | End: 2022-03-02

## 2022-03-02 RX ORDER — FENTANYL CITRATE 50 UG/ML
25 INJECTION, SOLUTION INTRAMUSCULAR; INTRAVENOUS
Status: DISCONTINUED | OUTPATIENT
Start: 2022-03-02 | End: 2022-03-02 | Stop reason: HOSPADM

## 2022-03-02 RX ORDER — DEXMEDETOMIDINE HYDROCHLORIDE 4 UG/ML
INJECTION, SOLUTION INTRAVENOUS PRN
Status: DISCONTINUED | OUTPATIENT
Start: 2022-03-02 | End: 2022-03-02

## 2022-03-02 RX ORDER — IBUPROFEN 600 MG/1
600 TABLET, FILM COATED ORAL EVERY 6 HOURS PRN
Qty: 28 TABLET | Refills: 0 | Status: SHIPPED | OUTPATIENT
Start: 2022-03-02

## 2022-03-02 RX ADMIN — FENTANYL CITRATE 50 MCG: 50 INJECTION, SOLUTION INTRAMUSCULAR; INTRAVENOUS at 08:40

## 2022-03-02 RX ADMIN — ROCURONIUM BROMIDE 30 MG: 50 INJECTION, SOLUTION INTRAVENOUS at 08:39

## 2022-03-02 RX ADMIN — LIDOCAINE HYDROCHLORIDE 100 MG: 20 INJECTION, SOLUTION INFILTRATION; PERINEURAL at 07:28

## 2022-03-02 RX ADMIN — FENTANYL CITRATE 50 MCG: 50 INJECTION, SOLUTION INTRAMUSCULAR; INTRAVENOUS at 07:28

## 2022-03-02 RX ADMIN — ROCURONIUM BROMIDE 50 MG: 50 INJECTION, SOLUTION INTRAVENOUS at 07:28

## 2022-03-02 RX ADMIN — PHENYLEPHRINE HYDROCHLORIDE 100 MCG: 10 INJECTION INTRAVENOUS at 07:58

## 2022-03-02 RX ADMIN — KETOROLAC TROMETHAMINE 30 MG: 30 INJECTION, SOLUTION INTRAMUSCULAR at 10:46

## 2022-03-02 RX ADMIN — PHENYLEPHRINE HYDROCHLORIDE 0.3 MCG/KG/MIN: 10 INJECTION INTRAVENOUS at 08:40

## 2022-03-02 RX ADMIN — SODIUM CHLORIDE, POTASSIUM CHLORIDE, SODIUM LACTATE AND CALCIUM CHLORIDE: 600; 310; 30; 20 INJECTION, SOLUTION INTRAVENOUS at 10:52

## 2022-03-02 RX ADMIN — PHENYLEPHRINE HYDROCHLORIDE 0.2 MCG/KG/MIN: 10 INJECTION INTRAVENOUS at 08:03

## 2022-03-02 RX ADMIN — ROCURONIUM BROMIDE 20 MG: 50 INJECTION, SOLUTION INTRAVENOUS at 09:41

## 2022-03-02 RX ADMIN — SODIUM CHLORIDE, POTASSIUM CHLORIDE, SODIUM LACTATE AND CALCIUM CHLORIDE: 600; 310; 30; 20 INJECTION, SOLUTION INTRAVENOUS at 07:21

## 2022-03-02 RX ADMIN — PHENYLEPHRINE HYDROCHLORIDE 50 MCG: 10 INJECTION INTRAVENOUS at 08:22

## 2022-03-02 RX ADMIN — PHENYLEPHRINE HYDROCHLORIDE 100 MCG: 10 INJECTION INTRAVENOUS at 07:48

## 2022-03-02 RX ADMIN — DEXMEDETOMIDINE 10 MCG: 100 INJECTION, SOLUTION, CONCENTRATE INTRAVENOUS at 09:31

## 2022-03-02 RX ADMIN — PHENYLEPHRINE HYDROCHLORIDE 0.3 MCG/KG/MIN: 10 INJECTION INTRAVENOUS at 08:47

## 2022-03-02 RX ADMIN — SUGAMMADEX 200 MG: 100 INJECTION, SOLUTION INTRAVENOUS at 10:58

## 2022-03-02 RX ADMIN — PROPOFOL 200 MG: 10 INJECTION, EMULSION INTRAVENOUS at 07:28

## 2022-03-02 RX ADMIN — PHENYLEPHRINE HYDROCHLORIDE 100 MCG: 10 INJECTION INTRAVENOUS at 10:46

## 2022-03-02 RX ADMIN — HYDROMORPHONE HYDROCHLORIDE 0.5 MG: 1 INJECTION, SOLUTION INTRAMUSCULAR; INTRAVENOUS; SUBCUTANEOUS at 10:28

## 2022-03-02 RX ADMIN — DEXMEDETOMIDINE 10 MCG: 100 INJECTION, SOLUTION, CONCENTRATE INTRAVENOUS at 10:15

## 2022-03-02 RX ADMIN — PHENYLEPHRINE HYDROCHLORIDE 100 MCG: 10 INJECTION INTRAVENOUS at 09:41

## 2022-03-02 RX ADMIN — MIDAZOLAM 2 MG: 1 INJECTION INTRAMUSCULAR; INTRAVENOUS at 07:21

## 2022-03-02 RX ADMIN — ONDANSETRON 4 MG: 2 INJECTION INTRAMUSCULAR; INTRAVENOUS at 10:29

## 2022-03-02 ASSESSMENT — COPD QUESTIONNAIRES: COPD: 1

## 2022-03-02 NOTE — OP NOTE
This is Dr. Charla Sarabia dictating the operative note on patient Aida Chong, birth date [birth year] medical record number [id number], for dental procedures performed on 3/2/2022.    It was deemed necessary for this patient to be seen in the hospital operating room because of inability to cooperate due to severe dental anxiety.    Consent: Risks complications including but not limited to post-operative pain, swelling, bleeding, infection, temporary/permanent paresthesia/anesthesia of CN V3, lingual nerve, failure to resolve chief complaint. Patient agrees to procedure as written, and patient signed consent.     Names:The attending physicians were David Lake DDS. The first assistant dental resident was Charla Sarabia DMD. The second assistant dental resident was Chandra Grimes DDS. The anesthesiologist was Makeda Blakely MD. The CRNA were Farzaneh Tate APRN CRNA and Vera Han APRN CRNA.The scrub person was Radha Chun.The circulatore were Beka Limon RN and Earl Pradhan RN.    Summary:  Under general anesthesia, the following operations were performed in the mouth: Dental examination, dental radiographs, dental prophylaxis of all teeth, 7 simple extractions, 7 fillings and application of fluoride gel on all teeth.    Diagnosis:  The pre-operative diagnosis was dental caries and gingival disease.  The post-operative diagnosis was dental caries and gingival disease.    General Anesthesia Start:The patient was brought into the operating room and draped in the usual customary [facility] fashion. Following the time-out procedures, the patient was placed under General Anesthesia Care via Right Naris. A moist throat pack was placed at 0843    Observations:  Clinical examination and Radiographic examination revealed on:  #3 (R upper molar) caries on DB and MO surfaces  #7 (R upper lateral) caries on MF surfaces  #9 (Left upper incisor)  caries on I surface  #15 (Left upper second molar) caries on DO surfaces   #19 (Left lower molar) caries on DO surfaces  #20 (Left lower second premolar) caries on DO surfaces  #22 (Left lower canine) mobility 1  #23 (left lower incisor) caries on MFLD  #24 (left lower incisor) gross decay on all surfaces  #25 (right lower incisor) gross decay on all surfaces  #26 (right lower incisor) gross decay on all surfaces  #27 (right lower canine) mobility 1  #28 (right lower first premolar) mobility 3  #29 (right lower first premolar) caries on D and M surfaces  #31 (right lower second molar) caries on DO surface    Local Anesthesia:  3.4 cc of mixture of 2% lidocaine 1:557796 epi given via the DON block (bilateral)  3.4 cc of mixture of 0.5% marcaine 1:055397 epi given via DON block (bilateral)    Procedure:The following procedures were performed:    Full mouth prophylaxis    [employer]algam restorations:   #3 (R upper molar) DB and MO surfaces  #15 (Left upper second molar) DO surfaces    #19 (Left lower molar) DO surfaces   #31 (right lower second molar) DO surface    Composite restorations:   #7 (R upper lateral) caries on MF surfaces  #9 (Left upper incisor) caries on I surface    Simple extractions of :   #20 (Left lower second premolar)  #23 (left lower incisor)   #24 (left lower incisor)   #25 (right lower incisor)   #26 (right lower incisor)   #28 (right lower first premolar)   #29 (right lower first premolar)     Digital compression performed. Surgicel placed in socket of #23-24-25-26 extraction site and 3-O chromic gut sutures placed on #23-24-25-26 extraction sites. Hemostasis achieved with surgicel, sutures, and local gauze pressure.       Throat packed removed at 1054.   The oropharynx was inspected and found to be clear.   Estimated blood loss was 2mL .   The attending doctor, Dr. Lake was present for the entire procedure.    Dental procedure Finish:  After the dental procedure, the patient was transferred to  Adult PACU in good condition.   The patient s nurse was informed by the dental team about the dental findings and procedures performed. Prescriptions for Ibuprofen and Acetaminophen were given to the nurse team for patients to take home.

## 2022-03-02 NOTE — DISCHARGE INSTRUCTIONS
Same-Day Surgery   Adult Discharge Orders & Instructions     For 24 hours after surgery:  1. Get plenty of rest.  A responsible adult must stay with you for at least 24 hours after you leave the hospital.   2. Pain medication can slow your reflexes. Do not drive or use heavy equipment.  If you have weakness or tingling, don't drive or use heavy equipment until this feeling goes away.  3. Mixing alcohol and pain medication can cause dizziness and slow your breathing. It can even be fatal. Do not drink alcohol while taking pain medication.  4. Avoid strenuous or risky activities.  Ask for help when climbing stairs.   5. You may feel lightheaded.  If so, sit for a few minutes before standing.  Have someone help you get up.   6. If you have nausea (feel sick to your stomach), drink only clear liquids such as apple juice, ginger ale, broth or 7-Up.  Rest may also help.  Be sure to drink enough fluids.  Move to a regular diet as you feel able. Take pain medications with a small amount of solid food, such as toast or crackers, to avoid nausea.   7. A slight fever is normal. Call the doctor if your fever is over 100 F (37.7 C) (taken under the tongue) or lasts longer than 24 hours.  8. You may have a dry mouth, muscle aches, trouble sleeping or a sore throat.  These symptoms should go away after 24 hours.  9. Do not make important or legal decisions.   Pain Management:      1. Take pain medication (if prescribed) for pain as directed by your physician.        2. WARNING: If the pain medication you have been prescribed contains Tylenol  (acetaminophen), DO NOT take additional doses of Tylenol (acetaminophen).     Call your doctor for any of the followin.  Signs of infection (fever, growing tenderness at the surgery site, severe pain, a large amount of drainage or bleeding, foul-smelling drainage, redness, swelling).    2.  It has been over 8 to 10 hours since surgery and you are still not able to urinate (pee).    3.   Headache for over 24 hours.      To contact a doctor, call Dr. Lake's clinic at the numbers above or:      [phone] and ask for the Resident On Call for:          Adult dentistry (answered 24 hours a day)      Emergency Department:  [place] [facility]: [phone]  [place] [facility]: [phone]

## 2022-03-02 NOTE — ANESTHESIA CARE TRANSFER NOTE
Patient: Aida Chong    Procedure: Procedure(s):  Bilateral dental exam, Dental radiograph, dental restorations,, , periodontal therapy, fluoride, varnish in the mouth, dental extractions extractions       Diagnosis: Dental caries [K02.9]  Dental infection [K04.7]  Diagnosis Additional Information: No value filed.    Anesthesia Type:   No value filed.     Note:    Oropharynx: spontaneously breathing  Level of Consciousness: awake  Oxygen Supplementation: face mask    Independent Airway: airway patency satisfactory and stable  Dentition: dentition unchanged  Vital Signs Stable: post-procedure vital signs reviewed and stable  Report to RN Given: handoff report given  Patient transferred to: PACU    Handoff Report: Identifed the Patient, Identified the Reponsible Provider, Reviewed the pertinent medical history, Discussed the surgical course, Reviewed Intra-OP anesthesia mangement and issues during anesthesia, Set expectations for post-procedure period and Allowed opportunity for questions and acknowledgement of understanding      Vitals:  Vitals Value Taken Time   /59 03/02/22 1106   Temp 36.8  C (98.2  F) 03/02/22 1106   Pulse 89 03/02/22 1112   Resp 17 03/02/22 1112   SpO2 100 % 03/02/22 1112   Vitals shown include unvalidated device data.    Electronically Signed By: CONSTANCE Bojorquez CRNA  March 2, 2022  11:13 AM

## 2022-03-02 NOTE — OR NURSING
Dr. Blakely of anesthesia notified patient is ready for sign out and proceeding to phase 2. She will see her in phase 2

## 2022-03-02 NOTE — ANESTHESIA PROCEDURE NOTES
Airway       Patient location during procedure: OR       Procedure Start/Stop Times: 3/2/2022 7:33 AM  Staff -        CRNA: Farzaneh Tate APRN CRNA       Performed By: CRNA  Consent for Airway        Urgency: elective  Indications and Patient Condition       Indications for airway management: arya-procedural       Induction type:intravenous       Mask difficulty assessment: 1 - vent by mask    Final Airway Details       Final airway type: endotracheal airway       Successful airway: Nasal and DEREK  Endotracheal Airway Details        ETT size (mm): 7.0       Successful intubation technique: direct laryngoscopy       DL Blade Type: [place] 2       Grade View of Cords: 1       Position: Right       Measured from: nares       Secured at (cm): 24    Post intubation assessment        Placement verified by: capnometry, equal breath sounds and chest rise        Number of attempts at approach: 1       Secured with: silk tape       Ease of procedure: easy       Dentition: Intact and Unchanged

## 2022-03-02 NOTE — ANESTHESIA POSTPROCEDURE EVALUATION
Patient: Aida Chong    Procedure: Procedure(s):  Bilateral dental exam, Dental radiograph, dental restorations,, , periodontal therapy, fluoride, varnish in the mouth, dental extractions extractions       Anesthesia Type:  General    Note:  Disposition: Outpatient   Postop Pain Control: Uneventful            Sign Out: Well controlled pain   PONV: No   Neuro/Psych: Uneventful            Sign Out: Acceptable/Baseline neuro status   Airway/Respiratory: Uneventful            Sign Out: Acceptable/Baseline resp. status   CV/Hemodynamics: Uneventful            Sign Out: Acceptable CV status; No obvious hypovolemia; No obvious fluid overload   Other NRE: NONE   DID A NON-ROUTINE EVENT OCCUR? No           Last vitals:  Vitals Value Taken Time   /54 03/02/22 1145   Temp 36.7  C (98  F) 03/02/22 1130   Pulse 102 03/02/22 1149   Resp 14 03/02/22 1150   SpO2 95 % 03/02/22 1152   Vitals shown include unvalidated device data.    Patient Vitals for the past 24 hrs:   BP Temp Temp src Pulse Resp SpO2 Height Weight   03/02/22 1159 100/53 36.9  C (98.5  F) Axillary -- 17 97 % -- --   03/02/22 1152 -- -- -- -- -- 95 % -- --   03/02/22 1145 102/54 -- -- 101 17 92 % -- --   03/02/22 1130 115/51 36.7  C (98  F) Axillary 96 13 95 % -- --   03/02/22 1115 118/58 -- -- 90 12 100 % -- --   03/02/22 1106 112/59 36.8  C (98.2  F) Oral 92 18 99 % -- --   03/02/22 0608 135/76 36.8  C (98.3  F) Oral 99 20 98 % 1.829 m (6') 76.9 kg (169 lb 8.5 oz)         Electronically Signed By: Makeda Blakely MD  March 2, 2022  12:08 PM

## 2022-03-02 NOTE — BRIEF OP NOTE
[facility]    Brief Operative Note    Pre-operative diagnosis: Dental caries [K02.9]  Dental infection [K04.7]  Post-operative diagnosis gingivitis, caries    Procedure: Procedure(s):  Bilateral dental exam, Dental radiograph, dental restorations,, , periodontal therapy, fluoride, varnish in the mouth, dental extractions extractions  Surgeon: Surgeon(s) and Role:     * David Lake DDS - Primary     * Chandra Grimes MD - Resident - Assisting     * Charla Sarabia MD - Resident - Assisting  Anesthesia: General   Estimated Blood Loss: 2mL    Drains: None  Specimens: * No specimens in log *  Findings:   None.  Complications: None.  Implants: * No implants in log *

## 2023-03-01 ENCOUNTER — HOSPITAL ENCOUNTER (EMERGENCY)
Facility: HOSPITAL | Age: 53
Discharge: HOME | End: 2023-03-01
Attending: EMERGENCY MEDICINE | Admitting: EMERGENCY MEDICINE
Payer: COMMERCIAL

## 2023-03-01 VITALS
DIASTOLIC BLOOD PRESSURE: 63 MMHG | RESPIRATION RATE: 18 BRPM | BODY MASS INDEX: 21.7 KG/M2 | SYSTOLIC BLOOD PRESSURE: 99 MMHG | OXYGEN SATURATION: 99 % | HEART RATE: 101 BPM | WEIGHT: 155 LBS | HEIGHT: 71 IN | TEMPERATURE: 98.8 F

## 2023-03-01 DIAGNOSIS — R73.9 HYPERGLYCEMIA: ICD-10-CM

## 2023-03-01 LAB
ALBUMIN SERPL BCG-MCNC: 4 G/DL (ref 3.5–5.2)
ALP SERPL-CCNC: 220 U/L (ref 35–104)
ALT SERPL W P-5'-P-CCNC: 23 U/L (ref 10–35)
ANION GAP SERPL CALCULATED.3IONS-SCNC: 10 MMOL/L (ref 7–15)
AST SERPL W P-5'-P-CCNC: 21 U/L (ref 10–35)
BASE EXCESS BLDV CALC-SCNC: 4.4 MMOL/L
BASOPHILS # BLD AUTO: 0 10E3/UL (ref 0–0.2)
BASOPHILS NFR BLD AUTO: 0 %
BILIRUB SERPL-MCNC: <0.2 MG/DL
BUN SERPL-MCNC: 12.6 MG/DL (ref 6–20)
CALCIUM SERPL-MCNC: 8.8 MG/DL (ref 8.6–10)
CHLORIDE SERPL-SCNC: 96 MMOL/L (ref 98–107)
CREAT SERPL-MCNC: 0.6 MG/DL (ref 0.51–0.95)
DEPRECATED HCO3 PLAS-SCNC: 28 MMOL/L (ref 22–29)
EOSINOPHIL # BLD AUTO: 0.1 10E3/UL (ref 0–0.7)
EOSINOPHIL NFR BLD AUTO: 2 %
ERYTHROCYTE [DISTWIDTH] IN BLOOD BY AUTOMATED COUNT: 13.2 % (ref 10–15)
GFR SERPL CREATININE-BSD FRML MDRD: >90 ML/MIN/1.73M2
GLUCOSE BLDC GLUCOMTR-MCNC: 326 MG/DL (ref 70–99)
GLUCOSE BLDC GLUCOMTR-MCNC: 426 MG/DL (ref 70–99)
GLUCOSE SERPL-MCNC: 428 MG/DL (ref 70–99)
HCO3 BLDV-SCNC: 30 MMOL/L (ref 24–30)
HCT VFR BLD AUTO: 40.5 % (ref 35–47)
HGB BLD-MCNC: 13.2 G/DL (ref 11.7–15.7)
HOLD SPECIMEN: NORMAL
IMM GRANULOCYTES # BLD: 0 10E3/UL
IMM GRANULOCYTES NFR BLD: 0 %
LYMPHOCYTES # BLD AUTO: 1.7 10E3/UL (ref 0.8–5.3)
LYMPHOCYTES NFR BLD AUTO: 32 %
MAGNESIUM SERPL-MCNC: 2 MG/DL (ref 1.7–2.3)
MCH RBC QN AUTO: 27.3 PG (ref 26.5–33)
MCHC RBC AUTO-ENTMCNC: 32.6 G/DL (ref 31.5–36.5)
MCV RBC AUTO: 84 FL (ref 78–100)
MONOCYTES # BLD AUTO: 0.3 10E3/UL (ref 0–1.3)
MONOCYTES NFR BLD AUTO: 6 %
NEUTROPHILS # BLD AUTO: 3.2 10E3/UL (ref 1.6–8.3)
NEUTROPHILS NFR BLD AUTO: 60 %
NRBC # BLD AUTO: 0 10E3/UL
NRBC BLD AUTO-RTO: 0 /100
OXYHGB MFR BLDV: 69.6 % (ref 70–75)
PCO2 BLDV: 53 MM HG (ref 35–50)
PH BLDV: 7.36 [PH] (ref 7.35–7.45)
PLATELET # BLD AUTO: 332 10E3/UL (ref 150–450)
PO2 BLDV: 37 MM HG (ref 25–47)
POTASSIUM SERPL-SCNC: 4.5 MMOL/L (ref 3.4–5.3)
PROT SERPL-MCNC: 6.9 G/DL (ref 6.4–8.3)
RBC # BLD AUTO: 4.83 10E6/UL (ref 3.8–5.2)
SAO2 % BLDV: 71.9 % (ref 70–75)
SODIUM SERPL-SCNC: 134 MMOL/L (ref 136–145)
WBC # BLD AUTO: 5.4 10E3/UL (ref 4–11)

## 2023-03-01 PROCEDURE — 83735 ASSAY OF MAGNESIUM: CPT

## 2023-03-01 PROCEDURE — 258N000003 HC RX IP 258 OP 636

## 2023-03-01 PROCEDURE — 82962 GLUCOSE BLOOD TEST: CPT

## 2023-03-01 PROCEDURE — 82805 BLOOD GASES W/O2 SATURATION: CPT

## 2023-03-01 PROCEDURE — 84132 ASSAY OF SERUM POTASSIUM: CPT

## 2023-03-01 PROCEDURE — 85025 COMPLETE CBC W/AUTO DIFF WBC: CPT

## 2023-03-01 PROCEDURE — 36415 COLL VENOUS BLD VENIPUNCTURE: CPT

## 2023-03-01 PROCEDURE — 96361 HYDRATE IV INFUSION ADD-ON: CPT

## 2023-03-01 PROCEDURE — 99284 EMERGENCY DEPT VISIT MOD MDM: CPT | Mod: 25

## 2023-03-01 PROCEDURE — 250N000012 HC RX MED GY IP 250 OP 636 PS 637

## 2023-03-01 PROCEDURE — 96372 THER/PROPH/DIAG INJ SC/IM: CPT

## 2023-03-01 PROCEDURE — 96360 HYDRATION IV INFUSION INIT: CPT

## 2023-03-01 RX ADMIN — SODIUM CHLORIDE 500 ML: 9 INJECTION, SOLUTION INTRAVENOUS at 22:15

## 2023-03-01 RX ADMIN — SODIUM CHLORIDE 1000 ML: 9 INJECTION, SOLUTION INTRAVENOUS at 19:47

## 2023-03-01 RX ADMIN — INSULIN ASPART 7 UNITS: 100 INJECTION, SOLUTION INTRAVENOUS; SUBCUTANEOUS at 20:57

## 2023-03-01 ASSESSMENT — ENCOUNTER SYMPTOMS
DIZZINESS: 0
LIGHT-HEADEDNESS: 0
DIAPHORESIS: 0
RHINORRHEA: 0
FEVER: 0
SEIZURES: 0
VOMITING: 0
HEMATURIA: 0
HEADACHES: 0
ABDOMINAL PAIN: 0
SHORTNESS OF BREATH: 0
SORE THROAT: 0
POLYDIPSIA: 1
PALPITATIONS: 0
WEAKNESS: 0
POLYPHAGIA: 0
FATIGUE: 1
COUGH: 0
NAUSEA: 0
CHILLS: 0
DYSURIA: 0
UNEXPECTED WEIGHT CHANGE: 0
NUMBNESS: 0

## 2023-03-01 ASSESSMENT — ACTIVITIES OF DAILY LIVING (ADL)
ADLS_ACUITY_SCORE: 35
ADLS_ACUITY_SCORE: 35

## 2023-03-02 NOTE — ED TRIAGE NOTES
Pt coming in via EMS with c/o hyperglycemia.  Pt has reported BS up with last bs 491 an hour ago.  Pt reports taking novolog 16 units along with her Metformin 500 mg.  EMS reports no AMS, no increased thirst and or urination.  Pt reports increased thirst.

## 2023-03-02 NOTE — ED PROVIDER NOTES
EMERGENCY DEPARTMENT ENCOUNTER      NAME: Aida Chong  AGE: 52 year old female  YOB: 1970  MRN: [id number]  EVALUATION DATE & TIME: No admission date for patient encounter.    PCP: Gail Yusuf    ED PROVIDER: Angeline Saucedo PA-C      Chief Complaint   Patient presents with     Hyperglycemia         FINAL IMPRESSION:  1. Hyperglycemia          ED COURSE & MEDICAL DECISION MAKIN:14 PM Met with patient for initial interview. Plan for care discussed.  7:28 PM Staffed patient with Dr. Bhatia.   9:00 PM Reevaluated and updated patient, feeling improved. Sleeping comfortably. HR improved to mid 80s.  9:50 PM Reevaluated and updated patient. Glucose improved to 326, HR improved to low 90s.   11:00 PM Reevaluated and updated patient. Patient feeling improved and requesting discharge home. Glucose improved and patient requesting discharge home. I discussed the plan for discharge with the patient, and patient is agreeable. We discussed supportive cares at home and reasons for return to the ER including new or worsening symptoms. All questions and concerns addressed. Patient to be discharged by RN.    Pertinent Labs & Imaging studies reviewed. (See chart for details)  52 year old female with a history of insulin-dependent DM II, HTN, HLD, polysubstance use (meth, heroin, tobacco), ADHD, bipolar disorder presents to the Emergency Department for evaluation of hyperglycemia. Upon exam, patient is afebrile, tachycardic, but in no acute distress and overall well appearing. Benign physical exam as documented below. Differential diagnosis includes but not limited to hyperglycemia without DKA, DKA, electrolyte abnormality, dehydration, anemia. Low suspicion for ACS, pneumonia, UTI, or other infectious etiology given no chest pain, shortness of breath, exertional symptoms, productive cough, fever/chills, URI-like symptoms, dysuria, urinary frequency/urgency, or other complaints. Based on patient's  presenting symptoms, laboratories and imaging were ordered.    CBC without leukocytosis or anemia. CMP with hyperglycemia at 428, but no increase in anion gap. CMP also showed mild hyponatremia at 134, hypochloremia at 96, and elevated alk phos at 220, but similar to previous and otherwise unremarkable. Mg WNL. VBG revealed normal pH at 7.36, no evidence of acidosis.     Patient was treated with IV NS and insulin aspart upon arrival with improvement in symptoms. Glucose improved to 326 1 hour after administration and 226 2 hours after administration. Patient was observed to confirm no rebound hypoglycemia. Symptoms and workup most consistent with hyperglycemia with no evidence of ketoacidosis. Patient was made aware of the above findings. Plan to discharge patient home with symptomatic management, strict return precautions, and close follow up with their PCP for reevaluation and ongoing management. The patient was stable and well appearing throughout entire ER visit and upon discharge. Discussed the importance of medication compliance especially with insulin. The patient was advised to return to the ER if any new or worsening symptoms develop. The patient verbalizes understanding and agrees with the plan. At the conclusion of the encounter I discussed the results of all of the tests and the disposition. The questions were answered. The patient or family acknowledged understanding and was agreeable with the care plan.     MEDICATIONS GIVEN IN THE EMERGENCY:  Medications   0.9% sodium chloride BOLUS (0 mLs Intravenous Stopped 3/1/23 2055)   insulin aspart (NovoLOG) injection (RAPID ACTING) (7 Units Subcutaneous $Given 3/1/23 2057)   0.9% sodium chloride BOLUS (0 mLs Intravenous Stopped 3/1/23 2303)       NEW PRESCRIPTIONS STARTED AT TODAY'S ER VISIT  Discharge Medication List as of 3/1/2023 11:04 PM             =================================================================    HPI    Patient information was obtained  from: patient    Use of : N/A      Aida Chong is a 52 year old female with a pertinent history of insulin-dependent DM II, HTN, HLD, polysubstance use (meth, heroin, tobacco), ADHD, bipolar disorder who presents to this ED via EMS for evaluation of hyperglycemia.  Patient reports elevated blood sugar reading on 491 around 6 PM. She had a chicken sandwich and fries for dinner and took her 6 PM dose of NovoLog as usual.  She reports taking 15 units of NovoLog at breakfast and lunch today as well in addition to her long-acting Lantus at 8 PM yesterday.  She has not taken her Lantus dose yet today.  She reports increased thirst, polyuria, and mild fatigue.  Otherwise, she denies chest pain, shortness of breath, abdominal pain, nausea, vomiting, diaphoresis, fever/chills, cough, URI-like symptoms, or any other complaints.  She states her last meth use was on Monday.  She reports she has been staying at a detox center since yesterday.  She reports approximately 1 year ago her insulin was decreased to 15 units, and she states she feels her blood sugars have been overall elevated since that decrease.      REVIEW OF SYSTEMS   Review of Systems   Constitutional: Positive for fatigue (mild). Negative for chills, diaphoresis, fever and unexpected weight change.   HENT: Negative for congestion, rhinorrhea and sore throat.    Eyes: Negative for visual disturbance.   Respiratory: Negative for cough and shortness of breath.    Cardiovascular: Negative for chest pain, palpitations and leg swelling.   Gastrointestinal: Negative for abdominal pain, nausea and vomiting.   Endocrine: Positive for polydipsia and polyuria. Negative for polyphagia.   Genitourinary: Negative for dysuria and hematuria.   Skin: Negative for rash.   Neurological: Negative for dizziness, seizures, syncope, weakness, light-headedness, numbness and headaches.   All other systems reviewed and are negative.      PAST MEDICAL HISTORY:  Past  Medical History:   Diagnosis Date     Cancer (H)      Diabetes (H)      Hypertension        PAST SURGICAL HISTORY:  Past Surgical History:   Procedure Laterality Date     EXAM UNDER ANESTHESIA, RESTORATIONS, EXTRACTION(S) DENTAL COMPLEX, COMBINED N/A 12/8/2016    Procedure: COMBINED EXAM UNDER ANESTHESIA, RESTORATIONS, EXTRACTION(S) DENTAL COMPLEX;  Surgeon: Abbe Paige DDS;  Location: [facility] OR     EXAM UNDER ANESTHESIA, RESTORATIONS, EXTRACTION(S) DENTAL COMPLEX, COMBINED N/A 3/2/2022    Procedure: Bilateral dental exam, Dental radiograph, dental restorations,, , periodontal therapy, fluoride, varnish in the mouth, dental extractions extractions;  Surgeon: David Lake DDS;  Location: [facility] OR     EXTRACTION(S) DENTAL       HYSTERECTOMY       PICC AND MIDLINE TEAM LINE INSERTION  10/8/2020          PICC INSERTION - TRIPLE LUMEN  10/5/2020          TUBAL LIGATION             CURRENT MEDICATIONS:    acetaminophen (TYLENOL) 325 MG tablet  ARIPiprazole (ABILIFY) 10 MG tablet  aspirin (ASA) 81 MG chewable tablet  benztropine (COGENTIN) 0.5 MG tablet  buPROPion (WELLBUTRIN XL) 150 MG 24 hr tablet  buPROPion (WELLBUTRIN XL) 300 MG 24 hr tablet  Cholecalciferol (D 5000) 5000 UNITS TABS  gabapentin (NEURONTIN) 300 MG capsule  hydrOXYzine (VISTARIL) 25 MG capsule  ibuprofen (ADVIL/MOTRIN) 600 MG tablet  insulin aspart (NOVOLOG FLEXPEN) 100 UNIT/ML pen  insulin glargine (LANTUS VIAL) 100 UNIT/ML vial  lisinopril (ZESTRIL) 2.5 MG tablet  meclizine (ANTIVERT) 25 MG tablet  metFORMIN (GLUCOPHAGE) 500 MG tablet  Multiple Vitamins-Minerals (THERAPEUTIC-M) TABS  propranolol (INDERAL) 10 MG tablet  simvastatin (ZOCOR) 40 MG tablet  terbinafine (LAMISIL) 250 MG tablet        ALLERGIES:  No Known Allergies    FAMILY HISTORY:  No family history on file.    SOCIAL HISTORY:   Social History     Socioeconomic History     Marital status: Single   Tobacco Use     Smoking status: Every Day     Packs/day: 0.50     Types: Cigarettes  "    Smokeless tobacco: Current     Tobacco comments:     1 pack every 3 days.   Substance and Sexual Activity     Alcohol use: No     Comment: sober since 2012     Drug use: No     Comment: drug free since 2014       VITALS:  BP 99/63   Pulse 101   Temp 98.8  F (37.1  C)   Resp 18   Ht 1.803 m (5' 11\")   Wt 70.3 kg (155 lb)   SpO2 99%   BMI 21.62 kg/m      PHYSICAL EXAM    Constitutional:  Alert, in no acute distress. Cooperative. Well-appearing.  EYES: PERRL. EOM intact. Conjunctivae clear.   HENT:  Atraumatic, normocephalic. Oropharynx without erythema or swelling. Dry membranes.  Respiratory:  Respirations even, unlabored, in no acute respiratory distress. Lungs clear to auscultation bilaterally without wheeze, rhonchi, or rales. No cough. Speaks in full sentences easily.  Cardiovascular:  Regular rate and rhythm, good peripheral perfusion. No peripheral edema. No chest wall tenderness.  GI: Soft, flat, nondistended, no tenderness to palpation, guarding, rebound, or peritoneal signs. Bowel sounds normal.  Musculoskeletal:  No edema. No cyanosis. Range of motion major extremities intact.    Integument: Warm, Dry, No erythema, No rash.   Neurologic:  Alert & oriented. No focal deficits noted. GCS 15.   Psych: Normal mood and affect.      LAB:  All pertinent labs reviewed and interpreted.  Results for orders placed or performed during the hospital encounter of 03/01/23   Comprehensive metabolic panel   Result Value Ref Range    Sodium 134 (L) 136 - 145 mmol/L    Potassium 4.5 3.4 - 5.3 mmol/L    Chloride 96 (L) 98 - 107 mmol/L    Carbon Dioxide (CO2) 28 22 - 29 mmol/L    Anion Gap 10 7 - 15 mmol/L    Urea Nitrogen 12.6 6.0 - 20.0 mg/dL    Creatinine 0.60 0.51 - 0.95 mg/dL    Calcium 8.8 8.6 - 10.0 mg/dL    Glucose 428 (H) 70 - 99 mg/dL    Alkaline Phosphatase 220 (H) 35 - 104 U/L    AST 21 10 - 35 U/L    ALT 23 10 - 35 U/L    Protein Total 6.9 6.4 - 8.3 g/dL    Albumin 4.0 3.5 - 5.2 g/dL    Bilirubin Total " <0.2 <=1.2 mg/dL    GFR Estimate >90 >60 mL/min/1.73m2   Result Value Ref Range    Magnesium 2.0 1.7 - 2.3 mg/dL   Blood gas venous   Result Value Ref Range    pH Venous 7.36 7.35 - 7.45    pCO2 Venous 53 (H) 35 - 50 mm Hg    pO2 Venous 37 25 - 47 mm Hg    Bicarbonate Venous 30 24 - 30 mmol/L    Base Excess/Deficit (+/-) 4.4   mmol/L    Oxyhemoglobin Venous 69.6 (L) 70.0 - 75.0 %    O2 Sat, Venous 71.9 70.0 - 75.0 %   Extra Red Top Tube   Result Value Ref Range    Hold Specimen JIC    CBC with platelets and differential   Result Value Ref Range    WBC Count 5.4 4.0 - 11.0 10e3/uL    RBC Count 4.83 3.80 - 5.20 10e6/uL    Hemoglobin 13.2 11.7 - 15.7 g/dL    Hematocrit 40.5 35.0 - 47.0 %    MCV 84 78 - 100 fL    MCH 27.3 26.5 - 33.0 pg    MCHC 32.6 31.5 - 36.5 g/dL    RDW 13.2 10.0 - 15.0 %    Platelet Count 332 150 - 450 10e3/uL    % Neutrophils 60 %    % Lymphocytes 32 %    % Monocytes 6 %    % Eosinophils 2 %    % Basophils 0 %    % Immature Granulocytes 0 %    NRBCs per 100 WBC 0 <1 /100    Absolute Neutrophils 3.2 1.6 - 8.3 10e3/uL    Absolute Lymphocytes 1.7 0.8 - 5.3 10e3/uL    Absolute Monocytes 0.3 0.0 - 1.3 10e3/uL    Absolute Eosinophils 0.1 0.0 - 0.7 10e3/uL    Absolute Basophils 0.0 0.0 - 0.2 10e3/uL    Absolute Immature Granulocytes 0.0 <=0.4 10e3/uL    Absolute NRBCs 0.0 10e3/uL   Glucose by meter   Result Value Ref Range    GLUCOSE BY METER POCT 426 (H) 70 - 99 mg/dL   Glucose by meter   Result Value Ref Range    GLUCOSE BY METER POCT 326 (H) 70 - 99 mg/dL     Angeline Saucedo PA-C  [facility] EMERGENCY DEPARTMENT  [address] [place] [id number] [phone]     Angeline Saucedo PA-C  03/01/23 3338

## 2023-03-02 NOTE — DISCHARGE INSTRUCTIONS
You are seen in the ER for evaluation of high blood sugar.  You are treated with IV fluids and insulin with improvement.  There is no evidence of diabetic ketoacidosis and your work-up was otherwise reassuring as discussed.    It is extremely important to stay compliant with your insulin dosing.  I recommend following up with your primary care provider later this week for reevaluation and ongoing diabetic management.    Return to the ER if any new or worsening symptoms develop including nausea, vomiting, abdominal pain, headache, dizziness/lightheadedness, chest pain, shortness of breath, palpitations, fever/chills, burning or blood with urination, cold-like symptoms, productive cough, or any other concerning symptoms.

## 2023-03-02 NOTE — ED PROVIDER NOTES
"Emergency Department Midlevel Supervisory Note     I personally saw the patient and performed a substantive portion of the visit including all aspects of the medical decision making.    ED Course:  7:28 PM Angeline Saucedo PA-C staffed patient with me. I agree with their assessment and plan of management, and I will see the patient.  8:30 PM I met with the patient to introduce myself, gather additional history, perform my initial exam, and discuss the plan.     Brief HPI:     Aida Chong is a 52 year old female who presents for evaluation of hyperglycemia.    Patient reports an elevated blood sugar reading of 491 around 6 PM. Patient has taken her insulin, 15 units NovoLog at breakfast and lunch as well as long acting Lantus at 8 PM yesterday. She has not taken her Lantus dose today. Patient endorses increased thirst, polyuria, and fatigue.     I, Dashawn Luis, am serving as a scribe to document services personally performed by Juan Bhatia MD, based on my observations and the provider's statements to me.   I, Juan Bhatia MD attest that Dashawn Luis was acting in a scribe capacity, has observed my performance of the services and has documented them in accordance with my direction.    Brief Physical Exam: BP 99/63   Pulse 101   Temp 98.8  F (37.1  C)   Resp 18   Ht 1.803 m (5' 11\")   Wt 70.3 kg (155 lb)   SpO2 99%   BMI 21.62 kg/m    Constitutional:  Alert, in no acute distress  EYES: Conjunctivae clear  HENT:  Atraumatic, normocephalic  Respiratory:  Respirations even, unlabored, in no acute respiratory distress  Cardiovascular:  Regular rate and rhythm, good peripheral perfusion  GI: Soft, nondistended  Musculoskeletal:  No edema. No cyanosis. Range of motion major extremities intact.   Integument: Warm, Dry, No erythema, No rash.   Neurologic:  Alert & oriented, no focal deficits noted  Psych: Normal mood and affect     MDM:  Patient is a 52-year-old with a history of insulin-dependent type 2 " diabetes who presents to the emergency department with hyperglycemia.  She is presenting from a drug treatment facility where her blood sugars have been elevated 400s to 500s.  She does not have any clinical signs or symptoms of diabetic ketoacidosis and her labs appear stable.  Other metabolic labs appear reassuring.  She was given some IV fluids as well as subcutaneous insulin and her blood sugar did downtrend to 200s, she was feeling improved on recheck.  Should be stable at this time for continued monitoring of her blood sugar herself, she has her insulin supplies at home.  Patient was in agreement with this plan and eager to discharge upon completion of her work-up and improvement in her blood sugars.  Agree with remainder of ED course and plan as documented by KARELY.       1. Hyperglycemia        Labs and Imaging:  Results for orders placed or performed during the hospital encounter of 03/01/23   Comprehensive metabolic panel   Result Value Ref Range    Sodium 134 (L) 136 - 145 mmol/L    Potassium 4.5 3.4 - 5.3 mmol/L    Chloride 96 (L) 98 - 107 mmol/L    Carbon Dioxide (CO2) 28 22 - 29 mmol/L    Anion Gap 10 7 - 15 mmol/L    Urea Nitrogen 12.6 6.0 - 20.0 mg/dL    Creatinine 0.60 0.51 - 0.95 mg/dL    Calcium 8.8 8.6 - 10.0 mg/dL    Glucose 428 (H) 70 - 99 mg/dL    Alkaline Phosphatase 220 (H) 35 - 104 U/L    AST 21 10 - 35 U/L    ALT 23 10 - 35 U/L    Protein Total 6.9 6.4 - 8.3 g/dL    Albumin 4.0 3.5 - 5.2 g/dL    Bilirubin Total <0.2 <=1.2 mg/dL    GFR Estimate >90 >60 mL/min/1.73m2   Result Value Ref Range    Magnesium 2.0 1.7 - 2.3 mg/dL   Blood gas venous   Result Value Ref Range    pH Venous 7.36 7.35 - 7.45    pCO2 Venous 53 (H) 35 - 50 mm Hg    pO2 Venous 37 25 - 47 mm Hg    Bicarbonate Venous 30 24 - 30 mmol/L    Base Excess/Deficit (+/-) 4.4   mmol/L    Oxyhemoglobin Venous 69.6 (L) 70.0 - 75.0 %    O2 Sat, Venous 71.9 70.0 - 75.0 %   Extra Red Top Tube   Result Value Ref Range    Hold Specimen JIC     CBC with platelets and differential   Result Value Ref Range    WBC Count 5.4 4.0 - 11.0 10e3/uL    RBC Count 4.83 3.80 - 5.20 10e6/uL    Hemoglobin 13.2 11.7 - 15.7 g/dL    Hematocrit 40.5 35.0 - 47.0 %    MCV 84 78 - 100 fL    MCH 27.3 26.5 - 33.0 pg    MCHC 32.6 31.5 - 36.5 g/dL    RDW 13.2 10.0 - 15.0 %    Platelet Count 332 150 - 450 10e3/uL    % Neutrophils 60 %    % Lymphocytes 32 %    % Monocytes 6 %    % Eosinophils 2 %    % Basophils 0 %    % Immature Granulocytes 0 %    NRBCs per 100 WBC 0 <1 /100    Absolute Neutrophils 3.2 1.6 - 8.3 10e3/uL    Absolute Lymphocytes 1.7 0.8 - 5.3 10e3/uL    Absolute Monocytes 0.3 0.0 - 1.3 10e3/uL    Absolute Eosinophils 0.1 0.0 - 0.7 10e3/uL    Absolute Basophils 0.0 0.0 - 0.2 10e3/uL    Absolute Immature Granulocytes 0.0 <=0.4 10e3/uL    Absolute NRBCs 0.0 10e3/uL   Glucose by meter   Result Value Ref Range    GLUCOSE BY METER POCT 426 (H) 70 - 99 mg/dL   Glucose by meter   Result Value Ref Range    GLUCOSE BY METER POCT 326 (H) 70 - 99 mg/dL     I have reviewed the relevant laboratory and radiology studies          Juan Bhatia MD  [facility] EMERGENCY DEPARTMENT  [address] [place] [id number] [phone]       Juan Bhatia MD  03/01/23 0141

## 2023-05-10 ENCOUNTER — HOSPITAL ENCOUNTER (EMERGENCY)
Facility: CLINIC | Age: 53
Discharge: LEFT BEFORE DISCHARGE | End: 2023-05-10
Admitting: EMERGENCY MEDICINE
Payer: COMMERCIAL

## 2023-05-10 VITALS
TEMPERATURE: 98.5 F | RESPIRATION RATE: 13 BRPM | OXYGEN SATURATION: 99 % | SYSTOLIC BLOOD PRESSURE: 125 MMHG | DIASTOLIC BLOOD PRESSURE: 77 MMHG | HEART RATE: 109 BPM

## 2023-05-10 LAB
ANION GAP SERPL CALCULATED.3IONS-SCNC: 13 MMOL/L (ref 7–15)
BUN SERPL-MCNC: 11.2 MG/DL (ref 6–20)
CALCIUM SERPL-MCNC: 9.1 MG/DL (ref 8.6–10)
CHLORIDE SERPL-SCNC: 94 MMOL/L (ref 98–107)
CREAT SERPL-MCNC: 0.64 MG/DL (ref 0.51–0.95)
DEPRECATED HCO3 PLAS-SCNC: 27 MMOL/L (ref 22–29)
ERYTHROCYTE [DISTWIDTH] IN BLOOD BY AUTOMATED COUNT: 12.7 % (ref 10–15)
GFR SERPL CREATININE-BSD FRML MDRD: >90 ML/MIN/1.73M2
GLUCOSE SERPL-MCNC: 380 MG/DL (ref 70–99)
HCT VFR BLD AUTO: 41.1 % (ref 35–47)
HGB BLD-MCNC: 14.1 G/DL (ref 11.7–15.7)
HOLD SPECIMEN: NORMAL
MCH RBC QN AUTO: 27.9 PG (ref 26.5–33)
MCHC RBC AUTO-ENTMCNC: 34.3 G/DL (ref 31.5–36.5)
MCV RBC AUTO: 81 FL (ref 78–100)
PLATELET # BLD AUTO: 330 10E3/UL (ref 150–450)
POTASSIUM SERPL-SCNC: 4.9 MMOL/L (ref 3.4–5.3)
RBC # BLD AUTO: 5.06 10E6/UL (ref 3.8–5.2)
SODIUM SERPL-SCNC: 134 MMOL/L (ref 136–145)
WBC # BLD AUTO: 7.7 10E3/UL (ref 4–11)

## 2023-05-10 PROCEDURE — 85014 HEMATOCRIT: CPT | Performed by: EMERGENCY MEDICINE

## 2023-05-10 PROCEDURE — 36415 COLL VENOUS BLD VENIPUNCTURE: CPT | Performed by: EMERGENCY MEDICINE

## 2023-05-10 PROCEDURE — 999N000104 HC STATISTIC NO CHARGE

## 2023-05-10 PROCEDURE — 80048 BASIC METABOLIC PNL TOTAL CA: CPT | Performed by: EMERGENCY MEDICINE

## 2023-05-10 PROCEDURE — 99281 EMR DPT VST MAYX REQ PHY/QHP: CPT

## 2023-05-10 NOTE — ED NOTES
Called pt two additional times with no answer. Pt called three times in total. Pt to be removed  From ER wait

## 2023-05-10 NOTE — ED TRIAGE NOTES
Triage Assessment     Row Name 05/10/23 1016       Triage Assessment (Adult)    Airway WDL WDL       Respiratory WDL    Respiratory WDL WDL       Skin Circulation/Temperature WDL    Skin Circulation/Temperature WDL X  ulcer to right foot       Cardiac WDL    Cardiac WDL WDL       Peripheral/Neurovascular WDL    Peripheral Neurovascular WDL WDL       Cognitive/Neuro/Behavioral WDL    Cognitive/Neuro/Behavioral WDL WDL

## 2023-05-10 NOTE — ED TRIAGE NOTES
Patient reports she has an ulcer to her right foot. States she has type 2 diabetes and neuropathy. Patient denies fever or chills. Was seen at [facility] and had xrays done.

## 2025-05-15 ENCOUNTER — ANESTHESIA EVENT (OUTPATIENT)
Dept: SURGERY | Facility: CLINIC | Age: 55
End: 2025-05-15
Payer: COMMERCIAL

## 2025-05-15 ENCOUNTER — HOSPITAL ENCOUNTER (OUTPATIENT)
Facility: CLINIC | Age: 55
Discharge: HOME | End: 2025-05-15
Attending: STUDENT IN AN ORGANIZED HEALTH CARE EDUCATION/TRAINING PROGRAM | Admitting: STUDENT IN AN ORGANIZED HEALTH CARE EDUCATION/TRAINING PROGRAM
Payer: COMMERCIAL

## 2025-05-15 ENCOUNTER — ANESTHESIA (OUTPATIENT)
Dept: SURGERY | Facility: CLINIC | Age: 55
End: 2025-05-15
Payer: COMMERCIAL

## 2025-05-15 VITALS
HEIGHT: 72 IN | SYSTOLIC BLOOD PRESSURE: 99 MMHG | BODY MASS INDEX: 22.35 KG/M2 | RESPIRATION RATE: 14 BRPM | HEART RATE: 77 BPM | TEMPERATURE: 97.1 F | OXYGEN SATURATION: 100 % | DIASTOLIC BLOOD PRESSURE: 57 MMHG | WEIGHT: 165 LBS

## 2025-05-15 DIAGNOSIS — G89.18 POST-OP PAIN: Primary | ICD-10-CM

## 2025-05-15 LAB
GLUCOSE BLDC GLUCOMTR-MCNC: 153 MG/DL (ref 70–99)
GLUCOSE BLDC GLUCOMTR-MCNC: 188 MG/DL (ref 70–99)

## 2025-05-15 PROCEDURE — 258N000001 HC RX 258: Performed by: STUDENT IN AN ORGANIZED HEALTH CARE EDUCATION/TRAINING PROGRAM

## 2025-05-15 PROCEDURE — 258N000003 HC RX IP 258 OP 636: Performed by: NURSE ANESTHETIST, CERTIFIED REGISTERED

## 2025-05-15 PROCEDURE — 82962 GLUCOSE BLOOD TEST: CPT

## 2025-05-15 PROCEDURE — 250N000011 HC RX IP 250 OP 636: Mod: JZ | Performed by: NURSE ANESTHETIST, CERTIFIED REGISTERED

## 2025-05-15 PROCEDURE — 258N000003 HC RX IP 258 OP 636: Performed by: STUDENT IN AN ORGANIZED HEALTH CARE EDUCATION/TRAINING PROGRAM

## 2025-05-15 PROCEDURE — 710N000012 HC RECOVERY PHASE 2, PER MINUTE: Performed by: STUDENT IN AN ORGANIZED HEALTH CARE EDUCATION/TRAINING PROGRAM

## 2025-05-15 PROCEDURE — 360N000076 HC SURGERY LEVEL 3, PER MIN: Performed by: STUDENT IN AN ORGANIZED HEALTH CARE EDUCATION/TRAINING PROGRAM

## 2025-05-15 PROCEDURE — 250N000011 HC RX IP 250 OP 636: Performed by: STUDENT IN AN ORGANIZED HEALTH CARE EDUCATION/TRAINING PROGRAM

## 2025-05-15 PROCEDURE — 272N000001 HC OR GENERAL SUPPLY STERILE: Performed by: STUDENT IN AN ORGANIZED HEALTH CARE EDUCATION/TRAINING PROGRAM

## 2025-05-15 PROCEDURE — 370N000017 HC ANESTHESIA TECHNICAL FEE, PER MIN: Performed by: STUDENT IN AN ORGANIZED HEALTH CARE EDUCATION/TRAINING PROGRAM

## 2025-05-15 PROCEDURE — 999N000141 HC STATISTIC PRE-PROCEDURE NURSING ASSESSMENT: Performed by: STUDENT IN AN ORGANIZED HEALTH CARE EDUCATION/TRAINING PROGRAM

## 2025-05-15 PROCEDURE — 250N000025 HC SEVOFLURANE, PER MIN: Performed by: STUDENT IN AN ORGANIZED HEALTH CARE EDUCATION/TRAINING PROGRAM

## 2025-05-15 RX ORDER — CEFAZOLIN SODIUM/WATER 2 G/20 ML
2 SYRINGE (ML) INTRAVENOUS SEE ADMIN INSTRUCTIONS
Status: DISCONTINUED | OUTPATIENT
Start: 2025-05-15 | End: 2025-05-15 | Stop reason: HOSPADM

## 2025-05-15 RX ORDER — CEFAZOLIN SODIUM/WATER 2 G/20 ML
2 SYRINGE (ML) INTRAVENOUS
Status: COMPLETED | OUTPATIENT
Start: 2025-05-15 | End: 2025-05-15

## 2025-05-15 RX ORDER — SODIUM CHLORIDE, SODIUM LACTATE, POTASSIUM CHLORIDE, CALCIUM CHLORIDE 600; 310; 30; 20 MG/100ML; MG/100ML; MG/100ML; MG/100ML
INJECTION, SOLUTION INTRAVENOUS CONTINUOUS
Status: DISCONTINUED | OUTPATIENT
Start: 2025-05-15 | End: 2025-05-15 | Stop reason: HOSPADM

## 2025-05-15 RX ORDER — SODIUM CHLORIDE, SODIUM LACTATE, POTASSIUM CHLORIDE, CALCIUM CHLORIDE 600; 310; 30; 20 MG/100ML; MG/100ML; MG/100ML; MG/100ML
INJECTION, SOLUTION INTRAVENOUS CONTINUOUS
Status: CANCELLED | OUTPATIENT
Start: 2025-05-15

## 2025-05-15 RX ORDER — OXYCODONE HYDROCHLORIDE 5 MG/1
5 TABLET ORAL EVERY 6 HOURS PRN
Qty: 12 TABLET | Refills: 0 | Status: SHIPPED | OUTPATIENT
Start: 2025-05-15 | End: 2025-05-18

## 2025-05-15 RX ORDER — ONDANSETRON 2 MG/ML
4 INJECTION INTRAMUSCULAR; INTRAVENOUS EVERY 30 MIN PRN
Status: DISCONTINUED | OUTPATIENT
Start: 2025-05-15 | End: 2025-05-15 | Stop reason: HOSPADM

## 2025-05-15 RX ORDER — PROPOFOL 10 MG/ML
INJECTION, EMULSION INTRAVENOUS PRN
Status: DISCONTINUED | OUTPATIENT
Start: 2025-05-15 | End: 2025-05-15

## 2025-05-15 RX ORDER — FENTANYL CITRATE 50 UG/ML
100 INJECTION, SOLUTION INTRAMUSCULAR; INTRAVENOUS
Refills: 0 | Status: DISCONTINUED | OUTPATIENT
Start: 2025-05-15 | End: 2025-05-15 | Stop reason: HOSPADM

## 2025-05-15 RX ORDER — DEXAMETHASONE SODIUM PHOSPHATE 4 MG/ML
4 INJECTION, SOLUTION INTRA-ARTICULAR; INTRALESIONAL; INTRAMUSCULAR; INTRAVENOUS; SOFT TISSUE
Status: CANCELLED | OUTPATIENT
Start: 2025-05-15

## 2025-05-15 RX ORDER — LIDOCAINE 40 MG/G
CREAM TOPICAL
Status: DISCONTINUED | OUTPATIENT
Start: 2025-05-15 | End: 2025-05-15 | Stop reason: HOSPADM

## 2025-05-15 RX ORDER — ONDANSETRON 4 MG/1
4 TABLET, ORALLY DISINTEGRATING ORAL EVERY 30 MIN PRN
Status: CANCELLED | OUTPATIENT
Start: 2025-05-15

## 2025-05-15 RX ORDER — METFORMIN HYDROCHLORIDE 500 MG/1
500 TABLET, EXTENDED RELEASE ORAL
COMMUNITY

## 2025-05-15 RX ORDER — OXYCODONE HYDROCHLORIDE 5 MG/1
5 TABLET ORAL
Status: DISCONTINUED | OUTPATIENT
Start: 2025-05-15 | End: 2025-05-15 | Stop reason: HOSPADM

## 2025-05-15 RX ORDER — LANOLIN ALCOHOL/MO/W.PET/CERES
1000 CREAM (GRAM) TOPICAL DAILY
COMMUNITY

## 2025-05-15 RX ORDER — OXYCODONE HYDROCHLORIDE 5 MG/1
10 TABLET ORAL
Status: DISCONTINUED | OUTPATIENT
Start: 2025-05-15 | End: 2025-05-15 | Stop reason: HOSPADM

## 2025-05-15 RX ORDER — DEXAMETHASONE SODIUM PHOSPHATE 10 MG/ML
4 INJECTION, SOLUTION INTRAMUSCULAR; INTRAVENOUS
Status: DISCONTINUED | OUTPATIENT
Start: 2025-05-15 | End: 2025-05-15 | Stop reason: HOSPADM

## 2025-05-15 RX ORDER — LORAZEPAM 0.5 MG/1
0.5 TABLET ORAL DAILY PRN
COMMUNITY

## 2025-05-15 RX ORDER — FENTANYL CITRATE 50 UG/ML
25 INJECTION, SOLUTION INTRAMUSCULAR; INTRAVENOUS EVERY 5 MIN PRN
Refills: 0 | Status: CANCELLED | OUTPATIENT
Start: 2025-05-15

## 2025-05-15 RX ORDER — FENTANYL CITRATE 50 UG/ML
50 INJECTION, SOLUTION INTRAMUSCULAR; INTRAVENOUS EVERY 5 MIN PRN
Refills: 0 | Status: CANCELLED | OUTPATIENT
Start: 2025-05-15

## 2025-05-15 RX ORDER — PRIMIDONE 50 MG/1
50 TABLET ORAL 2 TIMES DAILY
COMMUNITY

## 2025-05-15 RX ORDER — ONDANSETRON 2 MG/ML
4 INJECTION INTRAMUSCULAR; INTRAVENOUS EVERY 30 MIN PRN
Status: CANCELLED | OUTPATIENT
Start: 2025-05-15

## 2025-05-15 RX ORDER — NALOXONE HYDROCHLORIDE 0.4 MG/ML
0.1 INJECTION, SOLUTION INTRAMUSCULAR; INTRAVENOUS; SUBCUTANEOUS
Status: CANCELLED | OUTPATIENT
Start: 2025-05-15

## 2025-05-15 RX ORDER — ONDANSETRON 4 MG/1
4 TABLET, ORALLY DISINTEGRATING ORAL EVERY 30 MIN PRN
Status: DISCONTINUED | OUTPATIENT
Start: 2025-05-15 | End: 2025-05-15 | Stop reason: HOSPADM

## 2025-05-15 RX ORDER — PROPOFOL 10 MG/ML
INJECTION, EMULSION INTRAVENOUS CONTINUOUS PRN
Status: DISCONTINUED | OUTPATIENT
Start: 2025-05-15 | End: 2025-05-15

## 2025-05-15 RX ORDER — NALOXONE HYDROCHLORIDE 0.4 MG/ML
0.1 INJECTION, SOLUTION INTRAMUSCULAR; INTRAVENOUS; SUBCUTANEOUS
Status: DISCONTINUED | OUTPATIENT
Start: 2025-05-15 | End: 2025-05-15 | Stop reason: HOSPADM

## 2025-05-15 RX ORDER — VANCOMYCIN HYDROCHLORIDE 1 G/20ML
INJECTION, POWDER, LYOPHILIZED, FOR SOLUTION INTRAVENOUS PRN
Status: DISCONTINUED | OUTPATIENT
Start: 2025-05-15 | End: 2025-05-15 | Stop reason: HOSPADM

## 2025-05-15 RX ADMIN — PHENYLEPHRINE HYDROCHLORIDE 50 MCG: 10 INJECTION INTRAVENOUS at 11:19

## 2025-05-15 RX ADMIN — MIDAZOLAM HYDROCHLORIDE 1 MG: 1 INJECTION, SOLUTION INTRAMUSCULAR; INTRAVENOUS at 09:56

## 2025-05-15 RX ADMIN — PROPOFOL 150 MG: 10 INJECTION, EMULSION INTRAVENOUS at 10:56

## 2025-05-15 RX ADMIN — FENTANYL CITRATE 50 MCG: 50 INJECTION INTRAMUSCULAR; INTRAVENOUS at 09:55

## 2025-05-15 RX ADMIN — PROPOFOL 100 MCG/KG/MIN: 10 INJECTION, EMULSION INTRAVENOUS at 11:11

## 2025-05-15 RX ADMIN — PROPOFOL 50 MG: 10 INJECTION, EMULSION INTRAVENOUS at 10:58

## 2025-05-15 RX ADMIN — PROPOFOL 150 MCG/KG/MIN: 10 INJECTION, EMULSION INTRAVENOUS at 10:58

## 2025-05-15 RX ADMIN — SODIUM CHLORIDE, SODIUM LACTATE, POTASSIUM CHLORIDE, AND CALCIUM CHLORIDE: .6; .31; .03; .02 INJECTION, SOLUTION INTRAVENOUS at 08:52

## 2025-05-15 RX ADMIN — Medication 2 G: at 10:52

## 2025-05-15 ASSESSMENT — ACTIVITIES OF DAILY LIVING (ADL)
ADLS_ACUITY_SCORE: 24
ADLS_ACUITY_SCORE: 21
ADLS_ACUITY_SCORE: 21
ADLS_ACUITY_SCORE: 24
ADLS_ACUITY_SCORE: 26

## 2025-05-15 NOTE — PHARMACY-ADMISSION MEDICATION HISTORY
Pharmacist Admission Medication History    Admission medication history is complete. The information provided in this note is only as accurate as the sources available at the time of the update.    Information Source(s): Patient via in-person    Pertinent Information: No H&P or SureScripts.    Allergies reviewed with patient and updates made in EHR: yes    Medication History Completed By: Nicolette Berrios PharmKENNY 5/15/2025 8:58 AM    PTA Med List   Medication Sig Note Last Dose/Taking    acetaminophen (TYLENOL) 325 MG tablet Take 2 tablets (650 mg) by mouth every 4 hours as needed for mild pain  Past Week    ARIPiprazole (ABILIFY) 10 MG tablet Take 15 mg by mouth daily.  5/14/2025    aspirin (ASA) 81 MG chewable tablet Take 81 mg by mouth daily  5/14/2025    buPROPion (WELLBUTRIN XL) 150 MG 24 hr tablet Take 150 mg by mouth daily With 300 mg for total of 450 mg daily.  5/14/2025    buPROPion (WELLBUTRIN XL) 300 MG 24 hr tablet Take 300 mg by mouth daily With 150 mg for total of 450 mg daily.   5/14/2025    Cholecalciferol (D 5000) 5000 UNITS TABS Take 5,000 Units by mouth daily   5/14/2025    cyanocobalamin (VITAMIN B-12) 1000 MCG tablet Take 1,000 mcg by mouth daily.  5/14/2025    ibuprofen (ADVIL/MOTRIN) 600 MG tablet Take 1 tablet (600 mg) by mouth every 6 hours as needed for moderate pain  Past Month    insulin aspart (NOVOLOG FLEXPEN) 100 UNIT/ML pen Inject 15 Units subcutaneously 3 times daily (before meals).  5/14/2025 Evening    insulin glargine (LANTUS PEN) 100 UNIT/ML pen Inject 32 Units subcutaneously at bedtime.  5/14/2025 Bedtime    LORazepam (ATIVAN) 0.5 MG tablet Take 0.5 mg by mouth daily as needed for anxiety. 5/15/2025: No fills through [facility]  for past year More than a month    meclizine (ANTIVERT) 25 MG tablet Take 25 mg by mouth 3 times daily as needed for dizziness  More than a month    metFORMIN (GLUCOPHAGE XR) 500 MG 24 hr tablet Take 500 mg by mouth daily (with breakfast).  5/14/2025     Multiple Vitamins-Minerals (THERAPEUTIC-M) TABS Take 1 tablet by mouth daily   5/14/2025    primidone (MYSOLINE) 50 MG tablet Take 50 mg by mouth 2 times daily.  5/14/2025 Bedtime

## 2025-05-15 NOTE — ANESTHESIA CARE TRANSFER NOTE
Patient: Aida Chong    Procedure: Procedure(s):  RIGHT BELOW KNEE AMPUTATION WOUND IRRIGATION AND DEBRIDEMENT,  REVISION CLOSURE       Diagnosis: Amputated below knee, right (H) [S88.111A]  Diagnosis Additional Information: No value filed.    Anesthesia Type:   General     Note:    Oropharynx: oropharynx clear of all foreign objects and spontaneously breathing  Level of Consciousness: awake  Oxygen Supplementation: room air    Independent Airway: airway patency satisfactory and stable  Dentition: dentition unchanged  Vital Signs Stable: post-procedure vital signs reviewed and stable  Report to RN Given: handoff report given  Patient transferred to: Phase II    Handoff Report: Identifed the Patient, Identified the Reponsible Provider, Reviewed the pertinent medical history, Discussed the surgical course, Reviewed Intra-OP anesthesia mangement and issues during anesthesia, Set expectations for post-procedure period and Allowed opportunity for questions and acknowledgement of understanding      Vitals:  Vitals Value Taken Time   BP 91/53 05/15/25 11:44   Temp     Pulse 75 05/15/25 11:46   Resp     SpO2 100 % 05/15/25 11:46   Vitals shown include unfiled device data.    Electronically Signed By: CONSTANCE Mejía CRNA  May 15, 2025  11:47 AM

## 2025-05-15 NOTE — ANESTHESIA POSTPROCEDURE EVALUATION
Patient: Aida Chong    Procedure: Procedure(s):  RIGHT BELOW KNEE AMPUTATION WOUND IRRIGATION AND DEBRIDEMENT,  REVISION CLOSURE       Anesthesia Type:  MAC    Note:  Disposition: Outpatient   Postop Pain Control: Uneventful            Sign Out: Well controlled pain   PONV: No   Neuro/Psych: Uneventful            Sign Out: Acceptable/Baseline neuro status   Airway/Respiratory: Uneventful            Sign Out: Acceptable/Baseline resp. status   CV/Hemodynamics: Uneventful            Sign Out: Acceptable CV status; No obvious hypovolemia; No obvious fluid overload   Other NRE: NONE   DID A NON-ROUTINE EVENT OCCUR? No    Event details/Postop Comments:  Patient recovering comfortably.        Last vitals:  Vitals Value Taken Time   BP 99/57 05/15/25 12:01   Temp 36.2  C (97.1  F) 05/15/25 11:44   Pulse 77 05/15/25 12:33   Resp 14 05/15/25 11:44   SpO2 100 % 05/15/25 12:33   Vitals shown include unfiled device data.    Electronically Signed By: Karthik Dominguez DO  May 15, 2025  1:10 PM

## 2025-05-15 NOTE — H&P
ORTHOPEDIC CONSULTATION    Consultation  Aida Chong,  1970, MRN [id number]    Amputated below knee, right (H) [S88.111A]  Amputated below knee, right (H) [S88.111A]    PCP: Gail Yusuf, [phone]   Code status:  Prior       Extended Emergency Contact Information  Primary Emergency Contact: Timi Miller  Mobile Phone: [phone]  Relation: Other  Secondary Emergency Contact: Marija Chong   [facility]  Home Phone: [phone]  Work Phone: none  Mobile Phone: [phone]  Relation: Mother         IMPRESSION:  Right BKA delayed wound healing     PLAN:  I have discussed the above listed issue with Aida at length.  She had a history of a right BKA performed by me approximately 2 months ago.  She has had delayed wound healing following the procedure and multiple falls.  She does not have evidence of gerhard wound dehiscence but I have concerns about the length of time that it will take for her wound to fully heal there is a relatively large area with no overlying skin.  There are no signs of active infection today.  Will plan to proceed to the operating room for wound revision, irrigation debridement and closure.  Informed consent was obtained, right side marked the correct side.      CHIEF COMPLAINT: Delayed wound healing following right BKA    HISTORY OF PRESENT ILLNESS:  Aida Pearce is a very pleasant 55-year-old female who initially presented to my clinic with a right Charcot ankle deformity and chronic osteomyelitis.  We discussed limb salvage versus below-knee amputation.  She ultimately underwent a below-knee amputation about 2.5 months ago.  Her postoperative course has been complicated by slow wound healing.  Upon being seen in my clinic last week, she was found to have continued delayed wound healing.  Therefore, we opted to proceed with irrigation and debridement and revision closure.      ALLERGIES:   Review of patient's allergies indicates No Known  Allergies      MEDICATIONS UPON ADMISSION:  Medications were reviewed.  They include:   Medications Prior to Admission   Medication Sig Dispense Refill Last Dose/Taking    acetaminophen (TYLENOL) 325 MG tablet Take 2 tablets (650 mg) by mouth every 4 hours as needed for mild pain 50 tablet 0 Past Week    ARIPiprazole (ABILIFY) 10 MG tablet Take 15 mg by mouth daily.   5/14/2025    aspirin (ASA) 81 MG chewable tablet Take 81 mg by mouth daily   5/14/2025    buPROPion (WELLBUTRIN XL) 150 MG 24 hr tablet Take 150 mg by mouth daily With 300 mg for total of 450 mg daily.   5/14/2025    buPROPion (WELLBUTRIN XL) 300 MG 24 hr tablet Take 300 mg by mouth daily With 150 mg for total of 450 mg daily.    5/14/2025    Cholecalciferol (D 5000) 5000 UNITS TABS Take 5,000 Units by mouth daily    5/14/2025    cyanocobalamin (VITAMIN B-12) 1000 MCG tablet Take 1,000 mcg by mouth daily.   5/14/2025    ibuprofen (ADVIL/MOTRIN) 600 MG tablet Take 1 tablet (600 mg) by mouth every 6 hours as needed for moderate pain 28 tablet 0 Past Month    insulin aspart (NOVOLOG FLEXPEN) 100 UNIT/ML pen Inject 15 Units subcutaneously 3 times daily (before meals).   5/14/2025 Evening    insulin glargine (LANTUS PEN) 100 UNIT/ML pen Inject 32 Units subcutaneously at bedtime.   5/14/2025 Bedtime    LORazepam (ATIVAN) 0.5 MG tablet Take 0.5 mg by mouth daily as needed for anxiety.   More than a month    meclizine (ANTIVERT) 25 MG tablet Take 25 mg by mouth 3 times daily as needed for dizziness   More than a month    metFORMIN (GLUCOPHAGE XR) 500 MG 24 hr tablet Take 500 mg by mouth daily (with breakfast).   5/14/2025    Multiple Vitamins-Minerals (THERAPEUTIC-M) TABS Take 1 tablet by mouth daily    5/14/2025    primidone (MYSOLINE) 50 MG tablet Take 50 mg by mouth 2 times daily.   5/14/2025 Bedtime         SOCIAL HISTORY:   she  reports that she has been smoking cigarettes. She uses smokeless tobacco. She reports that she does not drink alcohol and does  not use drugs.      FAMILY HISTORY:  family history is not on file.      REVIEW OF SYSTEMS:   Reviewed with patient. See HPI, otherwise negative       PHYSICAL EXAMINATION:  Vitals: Temp:  [97.5  F (36.4  C)] 97.5  F (36.4  C)  Pulse:  [72-88] 72  Resp:  [12-20] 13  BP: ()/(55-63) 97/56  SpO2:  [99 %-100 %] 100 %  General: On examination, the patient is resting comfortably, NAD, awake, and alert and oriented to person, place, time, and, and general circumstances   Heart: Distal pulses intact on upper extremities and left lower extremity  Lungs: Nonlabored breathing on room air  Right lower extremity: There is a central area of superficial skin loss over the midportion of the incision.  No obvious areas of deep tracking.  There is some mild serous drainage with no gross purulence or surrounding erythema.  Patient is able to fully extend and flex her knee.  The remainder of the incision is fully healed.      Nick Vera MD, MD  [facility]  Date: 5/15/2025  Time: 10:45 AM    CC1:   Nick Vera MD    CC2:   Gail Yusuf

## 2025-05-15 NOTE — ANESTHESIA PREPROCEDURE EVALUATION
Anesthesia Pre-Procedure Evaluation    Patient: Aida Chong   MRN: [id number] : 1970          Procedure : Procedure(s):  RIGHT BELOW KNEE AMPUTATION WOUND IRRIGATION AND DEBRIDEMENT,  REVISION CLOSURE         Past Medical History:   Diagnosis Date    Cancer (H)     Chronic infection     Diabetes (H)     Hypertension       Past Surgical History:   Procedure Laterality Date    EXAM UNDER ANESTHESIA, RESTORATIONS, EXTRACTION(S) DENTAL COMPLEX, COMBINED N/A 2016    Procedure: COMBINED EXAM UNDER ANESTHESIA, RESTORATIONS, EXTRACTION(S) DENTAL COMPLEX;  Surgeon: Abbe Paige DDS;  Location: [facility] OR    EXAM UNDER ANESTHESIA, RESTORATIONS, EXTRACTION(S) DENTAL COMPLEX, COMBINED N/A 3/2/2022    Procedure: Bilateral dental exam, Dental radiograph, dental restorations,, , periodontal therapy, fluoride, varnish in the mouth, dental extractions extractions;  Surgeon: David Lake DDS;  Location: [facility] OR    EXTRACTION(S) DENTAL      HYSTERECTOMY      PICC AND MIDLINE TEAM LINE INSERTION  10/8/2020         PICC INSERTION - TRIPLE LUMEN  10/5/2020         TUBAL LIGATION        No Known Allergies   Social History     Tobacco Use    Smoking status: Every Day     Current packs/day: 0.50     Types: Cigarettes    Smokeless tobacco: Current    Tobacco comments:     1 pack every 3 days.   Substance Use Topics    Alcohol use: No     Comment: sober since       Wt Readings from Last 1 Encounters:   05/15/25 74.8 kg (165 lb)        Anesthesia Evaluation            ROS/MED HX  ENT/Pulmonary:     (+) sleep apnea,                                       Neurologic:       Cardiovascular:     (+)  hypertension- -   -  - -                                      METS/Exercise Tolerance:     Hematologic:       Musculoskeletal: Comment: chronic charcot ankle deformity and osteomyelitis      GI/Hepatic:       Renal/Genitourinary:       Endo:     (+)  type II DM,                    Psychiatric/Substance Use:     (+)  "psychiatric history anxiety, depression and bipolar   Recreational drug usage: Meth.    Infectious Disease:       Malignancy:       Other:              Physical Exam  Airway  Mallampati: II  TM distance: >3 FB  Neck ROM: full    Cardiovascular - normal exam   Dental   (+) Edentulous      Pulmonary - normal exam      Neurological - normal exam  She appears awake, alert and oriented x3.    Other Findings       OUTSIDE LABS:  CBC:   Lab Results   Component Value Date    WBC 7.7 05/10/2023    WBC 5.4 03/01/2023    HGB 14.1 05/10/2023    HGB 13.2 03/01/2023    HCT 41.1 05/10/2023    HCT 40.5 03/01/2023     05/10/2023     03/01/2023     BMP:   Lab Results   Component Value Date     (L) 05/10/2023     (L) 03/01/2023    POTASSIUM 4.9 05/10/2023    POTASSIUM 4.5 03/01/2023    CHLORIDE 94 (L) 05/10/2023    CHLORIDE 96 (L) 03/01/2023    CO2 27 05/10/2023    CO2 28 03/01/2023    BUN 11.2 05/10/2023    BUN 12.6 03/01/2023    CR 0.64 05/10/2023    CR 0.60 03/01/2023     (H) 05/15/2025     (H) 05/10/2023     COAGS: No results found for: \"PTT\", \"INR\", \"FIBR\"  POC:   Lab Results   Component Value Date     (H) 04/02/2017     HEPATIC:   Lab Results   Component Value Date    ALBUMIN 4.0 03/01/2023    PROTTOTAL 6.9 03/01/2023    ALT 23 03/01/2023    AST 21 03/01/2023    ALKPHOS 220 (H) 03/01/2023    BILITOTAL <0.2 03/01/2023     OTHER:   Lab Results   Component Value Date    LACT 1.6 10/06/2021    A1C 13.1 (H) 10/06/2021    CHRIS 9.1 05/10/2023    MAG 2.0 03/01/2023    CRP 0.4 10/19/2020    SED 42 (H) 10/19/2020       Anesthesia Plan    ASA Status:  3      NPO Status: NPO Appropriate   Anesthesia Type: MAC.  Airway: supraglottic airway.  Maintenance: TIVA.   Techniques and Equipment:     - Airway:  Planned airway equipment includes supraglottic airway.     Consents    Anesthesia Plan(s) and associated risks, benefits, and realistic alternatives discussed. Questions answered and " patient/representative(s) expressed understanding.     - Discussed: anesthesiologist     - Discussed with:  Patient               Postoperative Care    Pain management: plan for postoperative opioid use.     Comments:                   Karthik Dominguez DO    I have reviewed the pertinent notes and labs in the chart from the past 30 days and (re)examined the patient.  Any updates or changes from those notes are reflected in this note.    Clinically Significant Risk Factors Present on Admission                 # Drug Induced Platelet Defect: home medication list includes an antiplatelet medication   # Hypertension: Noted on problem list

## 2025-05-15 NOTE — OP NOTE
PATIENT: Aida Chong  MR# :   [id number]  DATE OF OPERATION: 05/15/25    SURGEON:  Nick Vera MD     ASSISTANT: ULICES Card     A skilled assistant was required due to the nature of the case for patient position, retraction, exposure, implant placement, closure and dressing application.      Preoperative diagnosis:   Delayed wound healing following Right below knee amputation     Postoperative diagnosis:   Delayed wound healing following right below knee amputation     Surgical procedure:   Right below knee amputation irrigation and excisional debridement down to and including muscle and deep fascia, with repeat closure    Anesthesia:  General with regional block    Drains: None    Estimated blood loss: 50 cc    IV fluids: Please see Anesthesia Report    Complications: None identified intraoperatively     Blood products: none given     Specimens: * No specimens in log *    Findings: No gross purulence. Deep tissue appears viable    COMPONENTS IMPLANTED:  * No implants in log *    Tourniquet time: not used    INDICATIONS:    Aida Chong is a 55 year old female who I had previously performed a below-knee amputation on approximately 10 weeks ago.  She has had multiple postoperative falls and issues with delayed wound healing.  Given the extended period of delayed wound healing issues, we made the decision to move forward with irrigation debridement and revision closure to hopefully prevent a deep infection and speed up the wound healing process.    Preoperatively, the nature of the procedure, risks and benefits, as well as alternatives including nonsurgical management were discussed in detail with the patient. I reviewed and discussed the patient's condition and relevant images with the patient. We discussed options for further evaluation and treatment, including conservative non-operative management versus surgical intervention.      We also discussed at length the risks and benefits of  surgery. Our discussion included but was not limited to the risk of pain, bleeding, infection, blood clots (DVT, PE), wound issues, continued cpain, post-operative joint stiffness, painful arc of motion, difficulty with ambulation, damage to nearby neurovascular structures, and need for further surgeries. The possibility of intra-operative and/or post-operative medical complications such as anesthesia complications or reactions, respiratory and cardiovascular events, stroke, heart attack and/or death were also discussed.     Specific details of the surgical procedure, hospitalization, recovery, rehabilitation, and long-term precautions were also presented. The final choices will be made at the time of procedure to match the anatomy and condition of the bone, ligaments, tendons, and muscles. All of patients questions were answered preoperatively at which time informed consent was taken.      PROCEDURE:    After proper identification of the patient including verification and marking of the surgical site, the patient was brought to the operating room.  General anesthesia was given without complications and prophylactic IV Ancef was confirmed to have been administered within the appropriate timeframe(s). The patient was placed in the supine position with All bony prominences were well padded.  The surgical site which was properly marked, was then prepped and draped in the usual sterile fashion. A timeout was done utilizing protocol to again identify the correct patient and operative site, which was marked appropriately.    I began by taking full-thickness incisions and then an elliptical fashion surrounding the area of delayed wound healing.  This area was excised down to the level of the deep muscle.  I did not encounter any gross purulence.  The deep muscle appeared to be viable.  The wound was then copiously irrigated with 3 L of normal saline.  I then coagulated any bleeding vessels.  I placed 1 g of vancomycin  powder.  I then proceeded to close the wound with 2-0 PDS, 3-0 Monocryl and 3-0 nylon suture.  A soft sterile dressing was placed.      The patient was then awakened from anesthesia and transferred the PACU in stable condition.    Sponge, needle, and instrument counts were correct at the conclusion of the procedure. The patient has palpable distal pulses in both lower extremity.     Postoperative Plan:  Pain Control: Continue per pain protocol.  Weight Bearing: Non weight bearing on right lower extremity until suture removal  Antibiotics: 24 hours of perioperative antibiotics  Follow up:  in 2 weeks in clinic for wound check  Discharge plan: to home    Dispo: stable to pacu    Nick Vera MD  Orthopedic Surgery  [facility]

## (undated) DEVICE — SOLIDIFIER (USE FOR UP TO 1500CC) MSOLID1500

## (undated) DEVICE — SOL WATER IRRIG 1000ML BOTTLE 2F7114

## (undated) DEVICE — GLOVE UNDER INDICATOR PI SZ 7.0 LF 41670

## (undated) DEVICE — ANTIFOG SOLUTION W/FOAM PAD 31142527

## (undated) DEVICE — BNDG ELASTIC 6"X5YDS UNSTERILE 6611-60

## (undated) DEVICE — SU PROLENE 3-0 PS-1 18" 8663G

## (undated) DEVICE — POSITIONER ARMBOARD FOAM 1PAIR LF FP-ARMB1

## (undated) DEVICE — PACK SET-UP STD 9102

## (undated) DEVICE — TUBING SUCTION MEDI-VAC 1/4"X20' N620A

## (undated) DEVICE — TAPE UMBILICAL COTTON 30X1/16IN 2 STRAND 8619-03A 8886861903

## (undated) DEVICE — SUTURE SILK 2-0 TIES 30IN SA85H

## (undated) DEVICE — SUCTION MANIFOLD NEPTUNE 2 SYS 1 PORT 702-025-000

## (undated) DEVICE — SPONGE RAY-TEC 4X8" 7318

## (undated) DEVICE — SYR EAR BULB 3OZ 0035830

## (undated) DEVICE — CAST PADDING 6" STERILE 9046S

## (undated) DEVICE — CUSTOM PACK TOTAL KNEE SOP5BTKHEC

## (undated) DEVICE — SOL NACL 0.9% IRRIG 1000ML BOTTLE 2F7124

## (undated) DEVICE — BASIN SET MAJOR

## (undated) DEVICE — GLOVE BIOGEL PI INDICATOR 8.0 LF 41680

## (undated) DEVICE — LINEN GOWN OVERSIZE 5408

## (undated) DEVICE — PREP POVIDONE IODINE SOLUTION 10% 4OZ BOTTLE 29906-004

## (undated) DEVICE — LABEL MEDICATION SYSTEM 3303-P

## (undated) DEVICE — TOOTHBRUSH ADULT NON STERILE MDS136850

## (undated) DEVICE — SU ETHILON 3-0 PS-2 18" 1669H

## (undated) DEVICE — LINEN GOWN X4 5410

## (undated) DEVICE — RX BACITRACIN OINTMENT 0.9G 1/32OZ CUR001109

## (undated) DEVICE — SU VICRYL+ 0 27IN CT-1 UND VCP260H

## (undated) DEVICE — PREP POVIDONE IODINE SWABSTICKS TRIPLE PACK MDS093902A

## (undated) DEVICE — GLOVE PROTEXIS W/NEU-THERA 8.5  2D73TE85

## (undated) DEVICE — ELECTRODE PATIENT RETURN ADULT L10 FT 2 PLATE CORD 0855C

## (undated) DEVICE — DRSG GAUZE 4X4" TRAY 6939

## (undated) DEVICE — SUCTION TIP YANKAUER W/O VENT K86

## (undated) DEVICE — LIGHT HANDLE X2

## (undated) DEVICE — SUTURE VICRYL+ 2-0 27IN CT-1 UND VCP259H

## (undated) DEVICE — SURGICEL HEMOSTAT 4X8" 1952

## (undated) DEVICE — BAG BIOHAZARD RED SMALL 24X23" A4823PR

## (undated) DEVICE — DRSG ABD TNDRSRB WET PRUF 8IN X 10IN STRL  9194A

## (undated) DEVICE — GLOVE BIOGEL PI ULTRATOUCH G SZ 7.5 42175

## (undated) DEVICE — SU ETHILON 3-0 PS-1 18" 1663G

## (undated) DEVICE — STRAP KNEE/BODY 31143004

## (undated) DEVICE — SU CHROMIC 3-0 PS-2 27" 1638H

## (undated) DEVICE — LINEN ORTHO PACK 5446

## (undated) DEVICE — SU PDS II 2-0 SH 27" Z317H

## (undated) DEVICE — SOL NACL 0.9% IRRIG 3000ML BAG 2B7127

## (undated) DEVICE — SUTURE MONOCRYL 3-0 18 PS2 UND MCP497G

## (undated) DEVICE — COVER PROBE ULTRASOUND 3D W/GEL 5X96" LF 20-P3D596

## (undated) DEVICE — ESU PENCIL SMOKE EVAC W/ROCKER SWITCH 0703-047-000

## (undated) DEVICE — Device

## (undated) DEVICE — DRSG ADAPTIC 3X8" 6113

## (undated) DEVICE — SOL HYDROGEN PEROXIDE 3% 4OZ BOTTLE F0010

## (undated) DEVICE — GLOVE SURG PI ULTRA TOUCH M SZ 7 LF 42670

## (undated) RX ORDER — HYDROMORPHONE HYDROCHLORIDE 1 MG/ML
INJECTION, SOLUTION INTRAMUSCULAR; INTRAVENOUS; SUBCUTANEOUS
Status: DISPENSED
Start: 2022-03-02

## (undated) RX ORDER — LIDOCAINE HYDROCHLORIDE 10 MG/ML
INJECTION, SOLUTION EPIDURAL; INFILTRATION; INTRACAUDAL; PERINEURAL
Status: DISPENSED
Start: 2025-05-15

## (undated) RX ORDER — PROPOFOL 10 MG/ML
INJECTION, EMULSION INTRAVENOUS
Status: DISPENSED
Start: 2025-05-15

## (undated) RX ORDER — FENTANYL CITRATE 50 UG/ML
INJECTION, SOLUTION INTRAMUSCULAR; INTRAVENOUS
Status: DISPENSED
Start: 2022-03-02

## (undated) RX ORDER — FENTANYL CITRATE-0.9 % NACL/PF 10 MCG/ML
PLASTIC BAG, INJECTION (ML) INTRAVENOUS
Status: DISPENSED
Start: 2025-05-15

## (undated) RX ORDER — ONDANSETRON 2 MG/ML
INJECTION INTRAMUSCULAR; INTRAVENOUS
Status: DISPENSED
Start: 2025-05-15

## (undated) RX ORDER — DEXAMETHASONE SODIUM PHOSPHATE 4 MG/ML
INJECTION, SOLUTION INTRA-ARTICULAR; INTRALESIONAL; INTRAMUSCULAR; INTRAVENOUS; SOFT TISSUE
Status: DISPENSED
Start: 2025-05-15

## (undated) RX ORDER — CHLORHEXIDINE GLUCONATE ORAL RINSE 1.2 MG/ML
SOLUTION DENTAL
Status: DISPENSED
Start: 2022-03-02

## (undated) RX ORDER — OXYMETAZOLINE HYDROCHLORIDE 0.05 G/100ML
SPRAY NASAL
Status: DISPENSED
Start: 2022-03-02